# Patient Record
Sex: FEMALE | Race: WHITE | NOT HISPANIC OR LATINO | Employment: OTHER | URBAN - METROPOLITAN AREA
[De-identification: names, ages, dates, MRNs, and addresses within clinical notes are randomized per-mention and may not be internally consistent; named-entity substitution may affect disease eponyms.]

---

## 2017-03-21 RX ORDER — SERTRALINE HYDROCHLORIDE 100 MG/1
TABLET, FILM COATED ORAL
Qty: 135 TABLET | Refills: 0 | Status: SHIPPED | OUTPATIENT
Start: 2017-03-21 | End: 2017-03-30 | Stop reason: SDUPTHER

## 2017-03-29 ENCOUNTER — DOCUMENTATION ONLY (OUTPATIENT)
Dept: ADMINISTRATIVE | Facility: HOSPITAL | Age: 57
End: 2017-03-29

## 2017-03-29 NOTE — PROGRESS NOTES
PRE-VISIT CHART REVIEW    Appointment Scheduled on 3/30/17    Department stratifications & guidelines reviewed:yes    Target Chronic Diagnosis: None    Chronic Diagnosis Intervention Due: no    Goals Updated:N/A    Health Maintenance Due   Topic Date Due    Hepatitis C Screening  1960    Mammogram  05/23/2016    Lipid Panel  05/23/2016    Influenza Vaccine  08/01/2016    Colonoscopy  04/24/2017       Advanced Directives:   65 years of age or older?  No  Directive on file?  N\A                                      Pre-visit patient communication:  In Person    Studies or screenings scheduled pre-visit: no

## 2017-03-30 ENCOUNTER — OFFICE VISIT (OUTPATIENT)
Dept: FAMILY MEDICINE | Facility: CLINIC | Age: 57
End: 2017-03-30
Payer: COMMERCIAL

## 2017-03-30 VITALS
HEART RATE: 74 BPM | DIASTOLIC BLOOD PRESSURE: 71 MMHG | BODY MASS INDEX: 20.16 KG/M2 | TEMPERATURE: 98 F | HEIGHT: 67 IN | SYSTOLIC BLOOD PRESSURE: 116 MMHG | WEIGHT: 128.44 LBS | RESPIRATION RATE: 12 BRPM

## 2017-03-30 DIAGNOSIS — Z00.00 ROUTINE PHYSICAL EXAMINATION: Primary | ICD-10-CM

## 2017-03-30 DIAGNOSIS — Z12.11 COLON CANCER SCREENING: ICD-10-CM

## 2017-03-30 DIAGNOSIS — Z11.59 NEED FOR HEPATITIS C SCREENING TEST: ICD-10-CM

## 2017-03-30 DIAGNOSIS — F41.9 ANXIETY: ICD-10-CM

## 2017-03-30 DIAGNOSIS — Z00.00 LABORATORY EXAM ORDERED AS PART OF ROUTINE GENERAL MEDICAL EXAMINATION: ICD-10-CM

## 2017-03-30 DIAGNOSIS — L98.9 SKIN LESION: ICD-10-CM

## 2017-03-30 DIAGNOSIS — F32.A DEPRESSION, UNSPECIFIED DEPRESSION TYPE: ICD-10-CM

## 2017-03-30 DIAGNOSIS — Z12.39 BREAST CANCER SCREENING: ICD-10-CM

## 2017-03-30 PROCEDURE — 99396 PREV VISIT EST AGE 40-64: CPT | Mod: S$GLB,,, | Performed by: NURSE PRACTITIONER

## 2017-03-30 RX ORDER — SERTRALINE HYDROCHLORIDE 100 MG/1
TABLET, FILM COATED ORAL
Qty: 135 TABLET | Refills: 11 | Status: SHIPPED | OUTPATIENT
Start: 2017-03-30 | End: 2017-04-20 | Stop reason: SDUPTHER

## 2017-03-30 NOTE — MR AVS SNAPSHOT
[facility]  [address]  [place] [address]  Phone: [phone]  Fax: [phone]                  Nina Menendez   3/30/2017 3:00 PM   Office Visit    Description:  Female : 1960   Provider:  Marisa Truong NP   Department:  [facility]           Reason for Visit     Medication Refill           Diagnoses this Visit        Comments    Need for hepatitis C screening test    -  Primary     Laboratory exam ordered as part of routine general medical examination         Breast cancer screening         Colon cancer screening         Skin lesion                To Do List           Future Appointments        Provider Department Dept Phone    4/3/2017 7:45 AM [id number] [phone]    4/3/2017 9:00 AM [facility] [place] [facility] [phone]      Goals (5 Years of Data)     None       These Medications        Disp Refills Start End    sertraline (ZOLOFT) 100 MG tablet 135 tablet 11 3/30/2017     TAKE ONE & ONE-HALF TABLETS BY MOUTH ONCE DAILY    Pharmacy: [address] #: [phone]         [facility]     [facility] Nurse Care Line -  Assistance  Unless otherwise directed by your provider, please contact [facility], our nurse care line that is available for  assistance.     Registered nurses in the [facility] provide: appointment scheduling, clinical advisement, health education, and other advisory services.  Call: [phone] (toll free)               Medications           Message regarding Medications     Verify the changes and/or additions to your medication regime listed below are the same as discussed with your clinician today.  If any of these changes or additions are incorrect, please notify your healthcare provider.             Verify that the below list of medications is an  "accurate representation of the medications you are currently taking.  If none reported, the list may be blank. If incorrect, please contact your healthcare provider. Carry this list with you in case of emergency.           Current Medications     diphenhydrAMINE (BENADRYL) 25 mg capsule Take 25 mg by mouth every 6 (six) hours as needed.    sertraline (ZOLOFT) 100 MG tablet TAKE ONE & ONE-HALF TABLETS BY MOUTH ONCE DAILY           Clinical Reference Information           Your Vitals Were     BP Pulse Temp Resp Height Weight    116/71 74 98.3 °F (36.8 °C) (Oral) 12 5' 7" (1.702 m) 58.3 kg (128 lb 6.7 oz)    Last Period BMI             11/14/2012 20.11 kg/m2         Blood Pressure          Most Recent Value    BP  116/71      Allergies as of 3/30/2017     Demerol [Meperidine]      Immunizations Administered on Date of Encounter - 3/30/2017     None      Orders Placed During Today's Visit      Normal Orders This Visit    Ambulatory consult to Dermatology     Case request GI: COLONOSCOPY     Future Labs/Procedures Expected by Expires    CBC auto differential  3/30/2017 5/29/2018    Comprehensive metabolic panel  3/30/2017 5/29/2018    Hemoglobin A1c  3/30/2017 5/29/2018    Hepatitis C antibody  3/30/2017 5/29/2018    Lipid panel  3/30/2017 5/29/2018    Mammo Digital Screening Bilat with CAD  3/30/2017 5/31/2018    TSH  3/30/2017 5/29/2018      Language Assistance Services     ATTENTION: Language assistance services are available, free of charge. Please call [phone].      ATENCIÓN: Si habla español, tiene a fontenot disposición servicios gratuitos de asistencia lingüística. Llame al [phone].     CHÚ Ý: N?u b?n nói Ti?ng Vi?t, có các d?ch v? h? tr? ngôn ng? mi?n phí dành cho b?n. G?i s? [phone].         [facility] complies with applicable Federal civil rights laws and does not discriminate on the basis of race, color, national origin, age, disability, or sex.        "

## 2017-03-30 NOTE — MEDICAL/APP STUDENT
Subjective:       Patient ID: Nina Menendez is a 56 y.o. female.    Chief Complaint: Medication Refill    HPI: Patient here for annual exam and medication refill. Currently on Zoloft 50mg by mouth daily which she states compliance and adequate at this time. Denies any acute changes in health since last exam one year ago. Does indicate that she has a mole on inner right thigh that she would like to be examined because it seems irregular in shape and palpation. No reports or bleeding, recent enlargement, or tenderness. Reports a residual, mild, intermittent cough that is non-productive from recent upper respiratory infection which is now improved.     Review of Systems   Constitutional: Negative.  Negative for activity change, appetite change, fatigue, fever and unexpected weight change.   HENT: Negative.  Negative for ear pain, hearing loss, nosebleeds, sinus pressure, sore throat, tinnitus, trouble swallowing and voice change.    Eyes: Negative.  Negative for photophobia, pain, redness and visual disturbance.   Respiratory: Positive for cough. Negative for chest tightness, shortness of breath and wheezing.    Cardiovascular: Negative for chest pain, palpitations and leg swelling.   Gastrointestinal: Negative for abdominal pain, blood in stool, constipation, diarrhea, nausea and vomiting.   Endocrine: Negative.  Negative for cold intolerance, heat intolerance, polydipsia, polyphagia and polyuria.   Genitourinary: Negative for difficulty urinating, dysuria, flank pain, hematuria, pelvic pain, vaginal bleeding, vaginal discharge and vaginal pain.   Musculoskeletal: Negative for arthralgias, back pain, myalgias, neck pain and neck stiffness.   Skin: Negative.  Negative for color change, pallor, rash and wound.        Mole right inner thigh that she feels is irregular. Denies any bleeding, increased size, or discomfort.    Allergic/Immunologic: Negative for environmental allergies, food allergies and  immunocompromised state.   Neurological: Negative for dizziness, weakness, light-headedness, numbness and headaches.   Hematological: Negative.  Negative for adenopathy. Does not bruise/bleed easily.   Psychiatric/Behavioral: Negative for dysphoric mood. The patient is nervous/anxious.        Objective:      Physical Exam   Constitutional: She is oriented to person, place, and time. Vital signs are normal. She appears well-developed and well-nourished.   HENT:   Head: Normocephalic.   Right Ear: Hearing, tympanic membrane, external ear and ear canal normal.   Left Ear: Hearing, tympanic membrane, external ear and ear canal normal.   Mouth/Throat: Uvula is midline, oropharynx is clear and moist and mucous membranes are normal. Tonsils are 1+ on the right. Tonsils are 1+ on the left. No tonsillar exudate.   Eyes: EOM are normal. Pupils are equal, round, and reactive to light.   Neck: Trachea normal, normal range of motion and full passive range of motion without pain. Neck supple. No JVD present. Carotid bruit is not present. No thyroid mass and no thyromegaly present.   Cardiovascular: Normal rate, regular rhythm, S1 normal, S2 normal and intact distal pulses.  Exam reveals gallop. Exam reveals no S3, no S4 and no friction rub.    No murmur heard.  Pulmonary/Chest: Effort normal and breath sounds normal.   Abdominal: Soft. Normal appearance and bowel sounds are normal. There is no tenderness.   Musculoskeletal: Normal range of motion.   Lymphadenopathy:     She has no cervical adenopathy.        Right cervical: No posterior cervical adenopathy present.       Left cervical: No posterior cervical adenopathy present.        Right: No supraclavicular adenopathy present.        Left: No supraclavicular adenopathy present.   Neurological: She is alert and oriented to person, place, and time. GCS eye subscore is 4. GCS verbal subscore is 5. GCS motor subscore is 6.   Skin: Skin is warm, dry and intact.   Psychiatric: She  has a normal mood and affect. Her speech is normal and behavior is normal.   Nursing note and vitals reviewed.      Assessment:       1. Need for hepatitis C screening test    2. Laboratory exam ordered as part of routine general medical examination    3. Breast cancer screening    4. Colon cancer screening    5. Skin lesion        Plan:

## 2017-03-30 NOTE — PROGRESS NOTES
Subjective:       Patient ID: Nina Menendez is a 56 y.o. female.    Chief Complaint: Medication Refill    HPI  Review of Systems    Objective:      Physical Exam    Assessment:       1. Need for hepatitis C screening test    2. Laboratory exam ordered as part of routine general medical examination    3. Breast cancer screening    4. Colon cancer screening    5. Skin lesion        Plan:       ***

## 2017-03-31 ENCOUNTER — TELEPHONE (OUTPATIENT)
Dept: FAMILY MEDICINE | Facility: CLINIC | Age: 57
End: 2017-03-31

## 2017-03-31 RX ORDER — NITROFURANTOIN (MACROCRYSTALS) 100 MG/1
100 CAPSULE ORAL NIGHTLY PRN
Qty: 30 CAPSULE | Refills: 0 | Status: SHIPPED | OUTPATIENT
Start: 2017-03-31 | End: 2017-06-07 | Stop reason: SDUPTHER

## 2017-03-31 NOTE — TELEPHONE ENCOUNTER
Spoke to pt and she stated at visit yesterday you discussed her getting on ABX.   This was not sent in to pharmacy.

## 2017-04-01 NOTE — PROGRESS NOTES
Medication Refill     HPI: Patient here for annual exam and medication refill. Currently on Zoloft 50mg by mouth daily which she states compliance and adequate at this time. Denies any acute changes in health since last exam one year ago. Does indicate that she has a mole on inner right thigh that she would like to be examined because it seems irregular in shape and palpation. No reports or bleeding, recent enlargement, or tenderness. Reports a residual, mild, intermittent cough that is non-productive from recent upper respiratory infection which is now improved.      Review of Systems   Constitutional: Negative. Negative for activity change, appetite change, fatigue, fever and unexpected weight change.   HENT: Negative. Negative for ear pain, hearing loss, nosebleeds, sinus pressure, sore throat, tinnitus, trouble swallowing and voice change.   Eyes: Negative. Negative for photophobia, pain, redness and visual disturbance.   Respiratory: Positive for cough. Negative for chest tightness, shortness of breath and wheezing.   Cardiovascular: Negative for chest pain, palpitations and leg swelling.   Gastrointestinal: Negative for abdominal pain, blood in stool, constipation, diarrhea, nausea and vomiting.   Endocrine: Negative. Negative for cold intolerance, heat intolerance, polydipsia, polyphagia and polyuria.   Genitourinary: Negative for difficulty urinating, dysuria, flank pain, hematuria, pelvic pain, vaginal bleeding, vaginal discharge and vaginal pain.   Musculoskeletal: Negative for arthralgias, back pain, myalgias, neck pain and neck stiffness.   Skin: Negative. Negative for color change, pallor, rash and wound.   Mole right inner thigh that she feels is irregular. Denies any bleeding, increased size, or discomfort.    Allergic/Immunologic: Negative for environmental allergies, food allergies and immunocompromised state.   Neurological: Negative for dizziness, weakness, light-headedness, numbness and  "headaches.   Hematological: Negative. Negative for adenopathy. Does not bruise/bleed easily.   Psychiatric/Behavioral: Negative for dysphoric mood. The patient is nervous/anxious.       Objective:      Vitals:    03/30/17 1459   BP: 116/71   Pulse: 74   Resp: 12   Temp: 98.3 °F (36.8 °C)   TempSrc: Oral   Weight: 58.3 kg (128 lb 6.7 oz)   Height: 5' 7" (1.702 m)   PainSc: 0-No pain       Physical Exam   Constitutional: She is oriented to person, place, and time. Vital signs are normal. She appears well-developed and well-nourished.   HENT:   Head: Normocephalic.   Right Ear: Hearing, tympanic membrane, external ear and ear canal normal.   Left Ear: Hearing, tympanic membrane, external ear and ear canal normal.   Mouth/Throat: Uvula is midline, oropharynx is clear and moist and mucous membranes are normal. Tonsils are 1+ on the right. Tonsils are 1+ on the left. No tonsillar exudate.   Eyes: EOM are normal. Pupils are equal, round, and reactive to light.   Neck: Trachea normal, normal range of motion and full passive range of motion without pain. Neck supple. No JVD present. Carotid bruit is not present. No thyroid mass and no thyromegaly present.   Cardiovascular: Normal rate, regular rhythm, S1 normal, S2 normal and intact distal pulses. Exam reveals gallop. Exam reveals no S3, no S4 and no friction rub.   No murmur heard.  Pulmonary/Chest: Effort normal and breath sounds normal.   Abdominal: Soft. Normal appearance and bowel sounds are normal. There is no tenderness.   Musculoskeletal: Normal range of motion.   Lymphadenopathy:   She has no cervical adenopathy.   Right cervical: No posterior cervical adenopathy present.  Left cervical: No posterior cervical adenopathy present.   Right: No supraclavicular adenopathy present.   Left: No supraclavicular adenopathy present.   Neurological: She is alert and oriented to person, place, and time. GCS eye subscore is 4. GCS verbal subscore is 5. GCS motor subscore is 6. "   Skin: Skin is warm, dry and intact.   Psychiatric: She has a normal mood and affect. Her speech is normal and behavior is normal.   Nursing note and vitals reviewed.      DX  Routine physical  Skin lesion  Depression/anxiety    Nina was seen today for medication refill.    Diagnoses and all orders for this visit:    Routine physical examination    Need for hepatitis C screening test  -     Hepatitis C antibody; Future    Laboratory exam ordered as part of routine general medical examination  -     CBC auto differential; Future  -     Comprehensive metabolic panel; Future  -     Hemoglobin A1c; Future  -     Lipid panel; Future  -     TSH; Future    Breast cancer screening  -     Mammo Digital Screening Bilat with CAD; Future    Colon cancer screening  -     Case request GI: COLONOSCOPY    Skin lesion  -     Cancel: Ambulatory consult to Dermatology  -     Ambulatory consult to Dermatology    Depression, unspecified depression type    Anxiety    Other orders  -     sertraline (ZOLOFT) 100 MG tablet; TAKE ONE & ONE-HALF TABLETS BY MOUTH ONCE DAILY

## 2017-04-03 ENCOUNTER — HOSPITAL ENCOUNTER (OUTPATIENT)
Dept: RADIOLOGY | Facility: HOSPITAL | Age: 57
Discharge: HOME | End: 2017-04-03
Attending: NURSE PRACTITIONER
Payer: COMMERCIAL

## 2017-04-03 DIAGNOSIS — Z12.39 BREAST CANCER SCREENING: ICD-10-CM

## 2017-04-03 DIAGNOSIS — Z12.31 VISIT FOR SCREENING MAMMOGRAM: ICD-10-CM

## 2017-04-03 PROCEDURE — 77067 SCR MAMMO BI INCL CAD: CPT | Mod: TC

## 2017-04-03 PROCEDURE — 77063 BREAST TOMOSYNTHESIS BI: CPT | Mod: 26,,, | Performed by: RADIOLOGY

## 2017-04-03 PROCEDURE — 77067 SCR MAMMO BI INCL CAD: CPT | Mod: 26,,, | Performed by: RADIOLOGY

## 2017-04-20 RX ORDER — SERTRALINE HYDROCHLORIDE 100 MG/1
TABLET, FILM COATED ORAL
Qty: 135 TABLET | Refills: 1 | Status: SHIPPED | OUTPATIENT
Start: 2017-04-20

## 2017-04-20 NOTE — TELEPHONE ENCOUNTER
----- Message from Bud Go sent at 4/20/2017 10:41 AM CDT -----  Contact: Patient  Patient called requesting that script be fax to [facility] for  135 tabs for 90 days mail order (Sertraline ) fax # [phone]. , [phone] If any questions call back at [phone]. Thanks,

## 2017-05-09 ENCOUNTER — PATIENT OUTREACH (OUTPATIENT)
Dept: ADMINISTRATIVE | Facility: HOSPITAL | Age: 57
End: 2017-05-09

## 2017-05-09 NOTE — PROGRESS NOTES
PRE-VISIT CHART REVIEW    Appointment Scheduled on 5/23/17    Department stratifications & guidelines reviewed:yes    Target Chronic Diagnosis: None    Chronic Diagnosis Intervention Due: no    Goals Updated:N/A    Health Maintenance Due   Topic Date Due    Colonoscopy  04/24/2017       Advanced Directives:   65 years of age or older?  No  Directive on file?  N\A                                      Pre-visit patient communication: 05/09/2017 MyChart/Letter    Studies or screenings scheduled pre-visit: no

## 2017-05-09 NOTE — LETTER
May 15, 2017    Nina Menendez  [address]  [place] [address]             [facility]  [address]  [facility] [address]  Phone: [phone] Dear Mrs. Menendez:    We have tried to reach you by mychart unsuccessfully.    [facility] is committed to your overall health.  To help you get the most out of each of your visits, we will review your information to make sure you are up to date on all of your recommended tests and/or procedures.       Dr. Renee Joshi has found that you may be due for colon cancer screening.     If you have had any of the above done at another facility, please bring the records or information with you so that your record at [facility] will be complete.  If you would like to schedule any of these, please contact me.     If you are currently taking medication, please bring it with you to your appointment for review.     If you have any questions or concerns, please don't hesitate to call.    Thank you for letting us care for you,  Barbara Brizuela LPN Clinical Care Coordinator  [facility]  [phone]

## 2017-05-12 ENCOUNTER — TELEPHONE (OUTPATIENT)
Dept: ENDOSCOPY | Facility: HOSPITAL | Age: 57
End: 2017-05-12

## 2017-05-12 NOTE — TELEPHONE ENCOUNTER
Spoke with patient via telephone for colonoscopy pre admit and patient states that she already notified Dr. Drake's office that she cancelled the procedure due to financial responsibility.

## 2017-06-07 RX ORDER — NITROFURANTOIN (MACROCRYSTALS) 100 MG/1
CAPSULE ORAL
Qty: 30 CAPSULE | Refills: 2 | Status: SHIPPED | OUTPATIENT
Start: 2017-06-07

## 2020-07-24 ENCOUNTER — LAB VISIT (OUTPATIENT)
Dept: PRIMARY CARE CLINIC | Facility: OTHER | Age: 60
End: 2020-07-24
Attending: INTERNAL MEDICINE
Payer: MEDICAID

## 2020-07-24 DIAGNOSIS — Z11.59 SPECIAL SCREENING EXAMINATION FOR UNSPECIFIED VIRAL DISEASE: ICD-10-CM

## 2020-07-24 PROCEDURE — U0003 INFECTIOUS AGENT DETECTION BY NUCLEIC ACID (DNA OR RNA); SEVERE ACUTE RESPIRATORY SYNDROME CORONAVIRUS 2 (SARS-COV-2) (CORONAVIRUS DISEASE [COVID-19]), AMPLIFIED PROBE TECHNIQUE, MAKING USE OF HIGH THROUGHPUT TECHNOLOGIES AS DESCRIBED BY CMS-2020-01-R: HCPCS

## 2020-07-27 LAB — SARS-COV-2 RNA RESP QL NAA+PROBE: NEGATIVE

## 2020-11-10 DIAGNOSIS — Z12.31 ENCOUNTER FOR SCREENING MAMMOGRAM FOR MALIGNANT NEOPLASM OF BREAST: Primary | ICD-10-CM

## 2020-12-11 ENCOUNTER — HOSPITAL ENCOUNTER (OUTPATIENT)
Dept: RADIOLOGY | Facility: HOSPITAL | Age: 60
Discharge: HOME | End: 2020-12-11
Attending: NURSE PRACTITIONER
Payer: MEDICAID

## 2020-12-11 DIAGNOSIS — Z12.31 ENCOUNTER FOR SCREENING MAMMOGRAM FOR MALIGNANT NEOPLASM OF BREAST: ICD-10-CM

## 2020-12-11 PROCEDURE — 77067 SCR MAMMO BI INCL CAD: CPT | Mod: TC,PO

## 2021-03-10 ENCOUNTER — IMMUNIZATION (OUTPATIENT)
Dept: PRIMARY CARE CLINIC | Facility: CLINIC | Age: 61
End: 2021-03-10

## 2021-03-10 DIAGNOSIS — Z23 NEED FOR VACCINATION: Primary | ICD-10-CM

## 2021-03-10 PROCEDURE — 91303 PR SARSCOV2 VAC AD26 .5ML IM: CPT | Mod: S$GLB,,, | Performed by: INTERNAL MEDICINE

## 2021-03-10 PROCEDURE — 0031A PR IMMUNIZ ADMIN, SARS-COV-2 COVID-19 VACC, 5X10VP/0.5ML: ICD-10-PCS | Mod: CV19,S$GLB,, | Performed by: INTERNAL MEDICINE

## 2021-03-10 PROCEDURE — 0031A PR IMMUNIZ ADMIN, SARS-COV-2 COVID-19 VACC, 5X10VP/0.5ML: CPT | Mod: CV19,S$GLB,, | Performed by: INTERNAL MEDICINE

## 2021-03-10 PROCEDURE — 91303 PR SARSCOV2 VAC AD26 .5ML IM: ICD-10-PCS | Mod: S$GLB,,, | Performed by: INTERNAL MEDICINE

## 2023-08-18 ENCOUNTER — TELEPHONE (OUTPATIENT)
Dept: FAMILY MEDICINE | Facility: CLINIC | Age: 63
End: 2023-08-18

## 2023-08-18 NOTE — TELEPHONE ENCOUNTER
----- Message from Yudelka Ku sent at 8/18/2023 12:47 PM CDT -----  Contact: self  Type:  Sooner Appointment Request    Caller is requesting a sooner appointment.  Caller declined first available appointment listed below.  Caller will not accept being placed on the waitlist and is requesting a message be sent to doctor.    Name of Caller:  pt  When is the first available appointment?  N/a  Symptoms:  n/a  Best Call Back Number:  [phone]   Additional Information:  please call

## 2023-09-26 DIAGNOSIS — Z78.0 MENOPAUSE: ICD-10-CM

## 2023-10-16 ENCOUNTER — LAB VISIT (OUTPATIENT)
Dept: LAB | Facility: HOSPITAL | Age: 63
End: 2023-10-16
Attending: INTERNAL MEDICINE
Payer: MEDICARE

## 2023-10-16 ENCOUNTER — OFFICE VISIT (OUTPATIENT)
Dept: PRIMARY CARE CLINIC | Facility: CLINIC | Age: 63
End: 2023-10-16
Payer: MEDICARE

## 2023-10-16 VITALS
TEMPERATURE: 98 F | HEIGHT: 66 IN | OXYGEN SATURATION: 98 % | DIASTOLIC BLOOD PRESSURE: 74 MMHG | BODY MASS INDEX: 20.07 KG/M2 | RESPIRATION RATE: 18 BRPM | SYSTOLIC BLOOD PRESSURE: 122 MMHG | HEART RATE: 67 BPM | WEIGHT: 124.88 LBS

## 2023-10-16 DIAGNOSIS — Z12.12 SCREENING FOR COLORECTAL CANCER: ICD-10-CM

## 2023-10-16 DIAGNOSIS — Z78.0 MENOPAUSE: ICD-10-CM

## 2023-10-16 DIAGNOSIS — Z11.4 SCREENING FOR HIV (HUMAN IMMUNODEFICIENCY VIRUS): ICD-10-CM

## 2023-10-16 DIAGNOSIS — F41.9 ANXIETY: ICD-10-CM

## 2023-10-16 DIAGNOSIS — Z11.59 NEED FOR HEPATITIS C SCREENING TEST: ICD-10-CM

## 2023-10-16 DIAGNOSIS — Z76.89 ENCOUNTER TO ESTABLISH CARE: Primary | ICD-10-CM

## 2023-10-16 DIAGNOSIS — R53.83 FATIGUE, UNSPECIFIED TYPE: ICD-10-CM

## 2023-10-16 DIAGNOSIS — Z12.11 SCREENING FOR COLORECTAL CANCER: ICD-10-CM

## 2023-10-16 DIAGNOSIS — Z13.6 SCREENING FOR CARDIOVASCULAR CONDITION: ICD-10-CM

## 2023-10-16 DIAGNOSIS — F32.0 CURRENT MILD EPISODE OF MAJOR DEPRESSIVE DISORDER, UNSPECIFIED WHETHER RECURRENT: ICD-10-CM

## 2023-10-16 LAB
ALBUMIN SERPL BCP-MCNC: 4.3 G/DL (ref 3.5–5.2)
ALP SERPL-CCNC: 83 U/L (ref 55–135)
ALT SERPL W/O P-5'-P-CCNC: 12 U/L (ref 10–44)
ANION GAP SERPL CALC-SCNC: 10 MMOL/L (ref 8–16)
AST SERPL-CCNC: 18 U/L (ref 10–40)
BASOPHILS # BLD AUTO: 0.04 K/UL (ref 0–0.2)
BASOPHILS NFR BLD: 0.9 % (ref 0–1.9)
BILIRUB SERPL-MCNC: 0.3 MG/DL (ref 0.1–1)
BUN SERPL-MCNC: 25 MG/DL (ref 8–23)
CALCIUM SERPL-MCNC: 9.8 MG/DL (ref 8.7–10.5)
CHLORIDE SERPL-SCNC: 105 MMOL/L (ref 95–110)
CHOLEST SERPL-MCNC: 255 MG/DL (ref 120–199)
CHOLEST/HDLC SERPL: 3.5 {RATIO} (ref 2–5)
CO2 SERPL-SCNC: 21 MMOL/L (ref 23–29)
CREAT SERPL-MCNC: 1.3 MG/DL (ref 0.5–1.4)
DIFFERENTIAL METHOD: ABNORMAL
EOSINOPHIL # BLD AUTO: 0.2 K/UL (ref 0–0.5)
EOSINOPHIL NFR BLD: 4 % (ref 0–8)
ERYTHROCYTE [DISTWIDTH] IN BLOOD BY AUTOMATED COUNT: 12.9 % (ref 11.5–14.5)
EST. GFR  (NO RACE VARIABLE): 45.6 ML/MIN/1.73 M^2
GLUCOSE SERPL-MCNC: 88 MG/DL (ref 70–110)
HCT VFR BLD AUTO: 38.2 % (ref 37–48.5)
HCV AB SERPL QL IA: NORMAL
HDLC SERPL-MCNC: 72 MG/DL (ref 40–75)
HDLC SERPL: 28.2 % (ref 20–50)
HGB BLD-MCNC: 12.4 G/DL (ref 12–16)
HIV 1+2 AB+HIV1 P24 AG SERPL QL IA: NORMAL
IMM GRANULOCYTES # BLD AUTO: 0 K/UL (ref 0–0.04)
IMM GRANULOCYTES NFR BLD AUTO: 0 % (ref 0–0.5)
LDLC SERPL CALC-MCNC: 169.2 MG/DL (ref 63–159)
LYMPHOCYTES # BLD AUTO: 2.3 K/UL (ref 1–4.8)
LYMPHOCYTES NFR BLD: 52.7 % (ref 18–48)
MCH RBC QN AUTO: 28.4 PG (ref 27–31)
MCHC RBC AUTO-ENTMCNC: 32.5 G/DL (ref 32–36)
MCV RBC AUTO: 88 FL (ref 82–98)
MONOCYTES # BLD AUTO: 0.4 K/UL (ref 0.3–1)
MONOCYTES NFR BLD: 9.6 % (ref 4–15)
NEUTROPHILS # BLD AUTO: 1.4 K/UL (ref 1.8–7.7)
NEUTROPHILS NFR BLD: 32.8 % (ref 38–73)
NONHDLC SERPL-MCNC: 183 MG/DL
NRBC BLD-RTO: 0 /100 WBC
PLATELET # BLD AUTO: 288 K/UL (ref 150–450)
PMV BLD AUTO: 9.5 FL (ref 9.2–12.9)
POTASSIUM SERPL-SCNC: 4.4 MMOL/L (ref 3.5–5.1)
PROT SERPL-MCNC: 7.5 G/DL (ref 6–8.4)
RBC # BLD AUTO: 4.36 M/UL (ref 4–5.4)
SODIUM SERPL-SCNC: 136 MMOL/L (ref 136–145)
TRIGL SERPL-MCNC: 69 MG/DL (ref 30–150)
TSH SERPL DL<=0.005 MIU/L-ACNC: 1.74 UIU/ML (ref 0.4–4)
WBC # BLD AUTO: 4.27 K/UL (ref 3.9–12.7)

## 2023-10-16 PROCEDURE — 99204 OFFICE O/P NEW MOD 45 MIN: CPT | Mod: S$GLB,,, | Performed by: INTERNAL MEDICINE

## 2023-10-16 PROCEDURE — 84443 ASSAY THYROID STIM HORMONE: CPT | Performed by: INTERNAL MEDICINE

## 2023-10-16 PROCEDURE — 3288F FALL RISK ASSESSMENT DOCD: CPT | Mod: CPTII,S$GLB,, | Performed by: INTERNAL MEDICINE

## 2023-10-16 PROCEDURE — 1101F PR PT FALLS ASSESS DOC 0-1 FALLS W/OUT INJ PAST YR: ICD-10-PCS | Mod: CPTII,S$GLB,, | Performed by: INTERNAL MEDICINE

## 2023-10-16 PROCEDURE — 1160F RVW MEDS BY RX/DR IN RCRD: CPT | Mod: CPTII,S$GLB,, | Performed by: INTERNAL MEDICINE

## 2023-10-16 PROCEDURE — 80061 LIPID PANEL: CPT | Performed by: INTERNAL MEDICINE

## 2023-10-16 PROCEDURE — 3074F PR MOST RECENT SYSTOLIC BLOOD PRESSURE < 130 MM HG: ICD-10-PCS | Mod: CPTII,S$GLB,, | Performed by: INTERNAL MEDICINE

## 2023-10-16 PROCEDURE — 3008F BODY MASS INDEX DOCD: CPT | Mod: CPTII,S$GLB,, | Performed by: INTERNAL MEDICINE

## 2023-10-16 PROCEDURE — 85025 COMPLETE CBC W/AUTO DIFF WBC: CPT | Performed by: INTERNAL MEDICINE

## 2023-10-16 PROCEDURE — 36415 COLL VENOUS BLD VENIPUNCTURE: CPT | Mod: PN | Performed by: INTERNAL MEDICINE

## 2023-10-16 PROCEDURE — 1158F PR ADVANCE CARE PLANNING DISCUSS DOCUMENTED IN MEDICAL RECORD: ICD-10-PCS | Mod: CPTII,S$GLB,, | Performed by: INTERNAL MEDICINE

## 2023-10-16 PROCEDURE — 99999 PR PBB SHADOW E&M-EST. PATIENT-LVL IV: CPT | Mod: PBBFAC,,, | Performed by: INTERNAL MEDICINE

## 2023-10-16 PROCEDURE — 86803 HEPATITIS C AB TEST: CPT | Performed by: INTERNAL MEDICINE

## 2023-10-16 PROCEDURE — 80053 COMPREHEN METABOLIC PANEL: CPT | Performed by: INTERNAL MEDICINE

## 2023-10-16 PROCEDURE — 99999 PR PBB SHADOW E&M-EST. PATIENT-LVL IV: ICD-10-PCS | Mod: PBBFAC,,, | Performed by: INTERNAL MEDICINE

## 2023-10-16 PROCEDURE — 3074F SYST BP LT 130 MM HG: CPT | Mod: CPTII,S$GLB,, | Performed by: INTERNAL MEDICINE

## 2023-10-16 PROCEDURE — 1160F PR REVIEW ALL MEDS BY PRESCRIBER/CLIN PHARMACIST DOCUMENTED: ICD-10-PCS | Mod: CPTII,S$GLB,, | Performed by: INTERNAL MEDICINE

## 2023-10-16 PROCEDURE — 3078F DIAST BP <80 MM HG: CPT | Mod: CPTII,S$GLB,, | Performed by: INTERNAL MEDICINE

## 2023-10-16 PROCEDURE — 87389 HIV-1 AG W/HIV-1&-2 AB AG IA: CPT | Performed by: INTERNAL MEDICINE

## 2023-10-16 PROCEDURE — 1159F PR MEDICATION LIST DOCUMENTED IN MEDICAL RECORD: ICD-10-PCS | Mod: CPTII,S$GLB,, | Performed by: INTERNAL MEDICINE

## 2023-10-16 PROCEDURE — 1159F MED LIST DOCD IN RCRD: CPT | Mod: CPTII,S$GLB,, | Performed by: INTERNAL MEDICINE

## 2023-10-16 PROCEDURE — 3008F PR BODY MASS INDEX (BMI) DOCUMENTED: ICD-10-PCS | Mod: CPTII,S$GLB,, | Performed by: INTERNAL MEDICINE

## 2023-10-16 PROCEDURE — 99204 PR OFFICE/OUTPT VISIT, NEW, LEVL IV, 45-59 MIN: ICD-10-PCS | Mod: S$GLB,,, | Performed by: INTERNAL MEDICINE

## 2023-10-16 PROCEDURE — 3288F PR FALLS RISK ASSESSMENT DOCUMENTED: ICD-10-PCS | Mod: CPTII,S$GLB,, | Performed by: INTERNAL MEDICINE

## 2023-10-16 PROCEDURE — 3078F PR MOST RECENT DIASTOLIC BLOOD PRESSURE < 80 MM HG: ICD-10-PCS | Mod: CPTII,S$GLB,, | Performed by: INTERNAL MEDICINE

## 2023-10-16 PROCEDURE — 1158F ADVNC CARE PLAN TLK DOCD: CPT | Mod: CPTII,S$GLB,, | Performed by: INTERNAL MEDICINE

## 2023-10-16 PROCEDURE — 1101F PT FALLS ASSESS-DOCD LE1/YR: CPT | Mod: CPTII,S$GLB,, | Performed by: INTERNAL MEDICINE

## 2023-10-16 RX ORDER — BUPROPION HYDROCHLORIDE 150 MG/1
TABLET, EXTENDED RELEASE ORAL
COMMUNITY
End: 2023-10-19 | Stop reason: SDUPTHER

## 2023-10-16 NOTE — PROGRESS NOTES
[facility] INITIAL V[facility]IT [facility]      CHIEF COMPLAINT     Chief Complaint   Patient presents with    Establish Care       HPI     Nina Menendez is a 63 y.o.  female who presents with a PMHx of  anxiety and depression, HLD, insomnia, who presents today to establish care     Her main complaints and concerns are:     Insomnia: sometimes trouble falling asleep and sometime trouble staying asleep. Has not tried anything like melatonin.     Anxiety: says her mood is "good". Gets lonely when her daughter leaves on Sunday. But one of her twins live with her. used to take zoloft for depression. Says not sure when this was stopped. Now takes wellbutrin  takes zoloft 100mg daily.   GAD7 is 7/21  Denies HI/SI   PHQ 9 is 5/27     HLD: no recent labs on file.     Insomnia: Says her sleep is 'not good"   Has trouble with sleep initiation/maintenance    Smoking history. Chapito smoked 20 years ago. Then during covid smoked for a short while,. But has since stopped        CHRONIC HEALTH ISSUES:   Past Medical History:  Past Medical History:   Diagnosis Date    Anxiety     ASCVD 10 YEAR RISK 1.9% 04/06/2017    Depression     Hyperlipemia     in the 90s    Insomnia     S/P colonoscopy     4/12;  next due 4/17         Geriatric Assessment    ADLs : independent  iADLs: independent    Polypharmacy:no. Stopped the benadryl.     Visual impairments: no change    Hearing impairment: No change    Urinary incontinence: no     Malnutrition: no     Gait/balance/falls: no falls, normal balance gait     Depression: PHQ2. "Good"     Sleep: see HPI     Cognitive Problems: minicog.5/5. no memory complaints.     Environmental/social problems: lives by herself with her daughter. Feels safe at home. No rugs. No grab bars. No guns at home     Functional status:     Support system:     Advanced care planning:  does not have on file. But discussed at length today and she will fill out the paperwork and return it to clinic to be scanned in. "    Date: 10/16/2023    Power of   I initiated the process of voluntary advance care planning today and explained the importance of this process to the patient.  I introduced the concept of advance directives to the patient, as well. Then the patient received detailed information about the importance of designating a Health Care Power of  (HCPOA). She was also instructed to communicate with this person about their wishes for future healthcare, should she become sick and lose decision-making capacity. The patient has not previously appointed a HCPOA. After our discussion, the patient has decided to complete a HCPOA and has appointed her son, health care agent:  on file  & health care agent number:  on fnile  I spent a total time of 10 minutes discussing this issue with the patient.              Worry score 1\    Past Surgical History:  Past Surgical History:   Procedure Laterality Date    FOOT SURGERY Bilateral 2008    fracture coorrection in right, toe surgery on the left foot    FRACTURE SURGERY      R foot     OVARIAN CYST REMOVAL      TONSILLECTOMY         Allergies:  Review of patient's allergies indicates:   Allergen Reactions    Demerol [meperidine] Itching       Home Medications:  Prior to Admission medications    Medication Sig Start Date End Date Taking? Authorizing Provider   diphenhydrAMINE (BENADRYL) 25 mg capsule Take 25 mg by mouth every 6 (six) hours as needed.    Provider, Historical   nitrofurantoin (MACRODANTIN) 100 MG capsule TAKE ONE CAPSULE BY MOUTH ONCE DAILY AT BEDTIME AS NEEDED 6/7/17   Marisa Truong NP   sertraline (ZOLOFT) 100 MG tablet TAKE ONE & ONE-HALF TABLETS BY MOUTH ONCE DAILY 4/20/17   Marisa Truong NP       Family History:  Family History   Problem Relation Age of Onset    Thyroid disease Mother     Hyperlipidemia Mother     Brain cancer Father     Hyperlipidemia Father     Drug abuse Brother     No Known Problems Brother     Hypertension Brother      "Heart attack Maternal Grandmother     Arthritis Maternal Grandfather     Hyperlipidemia Paternal Grandmother     Diabetes Paternal Grandmother     Lung cancer Paternal Grandfather        Social History:  Social History     Tobacco Use    Smoking status: Former     Current packs/day: 0.00     Average packs/day: 1.5 packs/day for 16.0 years (24.0 ttl pk-yrs)     Types: Cigarettes     Start date: 3/8/1979     Quit date: 3/8/1995     Years since quittin.6    Smokeless tobacco: Never   Substance Use Topics    Alcohol use: No     Comment: martini's/cocktails 1-2 xs a month    Drug use: No       Review of Systems:  ROS    Health Maintainence:   TDap is up to date, Influenza is up to date   Pneumovax is not up to date.   Zostavax is UTD.       Covid Is utd  Cancer Screening:  PAP: is up to date.   Mammogram: is up to date.   Colonoscopy: is not up to date.   DEXA:  is not up to date.   Low Dose CT scan does not qualify. Quit smoking 28y years ago. Restarted during covid for a few weeks  Screening:  Hepatitis C is up to date in pts born between 0901-8559. **    PHYSICAL EXAM     /74 (BP Location: Left arm, Patient Position: Sitting, BP Method: Medium (Manual))   Pulse 67   Temp 97.9 °F (36.6 °C) (Oral)   Resp 18   Ht 5' 6" (1.676 m)   Wt 56.7 kg (124 lb 14.3 oz)   LMP 2012   SpO2 98%   BMI 20.16 kg/m²     GEN - A+OX4, NAD   HEENT - PERRL, EOMI, OP clear. MMM.   Neck - No thyromegaly or cervical LAD. No thyroid masses felt.  CV - RRR, no m/r   Chest - CTAB, no wheezing or rhonchi  Abd - S/NT/ND/+BS.   Ext - 2+BDP and radial pulses. No LE edema.   Neuro - PERRL, EOMI, no nystagmus, eyebrow raise, facial sensation, hearing, m of mastication, smile, palatal raise, shoulder shrug, tongue protrusion symmetric and intact. 5/5 BUE and BLE strength. Sensation to light touch intact throughout. 2+ DTRs. Normal gait.   MSK - No spinal tenderness to palpation. Normal gait.   Skin - No rash.    LABS     Previous " labs reviewed.      ASSESSMENT/PLAN     Nina Menenedz is a 63 y.o. female with  1. Encounter to establish care  -medications reviewed and consolidated  -reviewed PMH, medications, HCM including vaccinations.   -reviewed most recent lab work. Reordered as needed. Discussed abnormalities with patient with time allowed for questions.   -reviewed clinic policy with patient including to arrive 15 mins before appointments, labs and results including expected turn around for results.   -outside records and consultants notes reviewed as time allowed. Updated treatment team with name of other providers.   -reviewed and confirmed patients contact information, preferred pharmacy etc.   -AVS provided after visit to summarized things discussed.   -The following assessments were completed:  Living Situation  CAGE  Depression Screening  Timed Get Up and Go  Cognitive Function Screening-Minicog   Nutrition Screening  ADL Screening      2. Anxiety  Overview:  GAD7 7/21. Mood is good with wellbutrin. Used to be on zoloft but no more.   Denies SI/HI      3. Current mild episode of major depressive disorder, unspecified whether recurrent  Overview:  PHQ 9 5/27. Mood is good with wellbutrin. Used to be on zoloft but no more.   Denies SI/HI      4. Fatigue, unspecified type  -     CBC Auto Differential; Future; Expected date: 10/16/2023  -     Comprehensive Metabolic Panel; Future; Expected date: 10/16/2023  -     TSH; Future; Expected date: 10/16/2023    5. Screening for cardiovascular condition  -     Lipid Panel; Future; Expected date: 10/16/2023    6. Need for hepatitis C screening test  -     Hepatitis C Antibody; Future; Expected date: 10/16/2023    7. Screening for HIV (human immunodeficiency virus)  -     HIV 1/2 Ag/Ab (4th Gen); Future; Expected date: 10/16/2023    8. Screening for colorectal cancer  -     Case Request Endoscopy: COLONOSCOPY    9. Menopause  -     DXA Bone Density Axial Skeleton 1 or more sites; Future;  Expected date: 10/16/2023         ACP: discussion had about ACP to include definition of ACP, HCP, CODE STATUS. Paperwork handed to patient, to review, discuss with family and return it to clinic to be scanned into the chart.   Advance Care Planning             My total time spent on this encounter was at least 60 minutes which included  the following activities: preparing to see the patient, performing a medically appropriate and/or evaluation, counseling and educating the patient and family/caregiver, ordering medications, tests, or procedures, referring and communicating with other healthcare providers, documenting clinical information in the electronic or other health record. This time is independent and non-overlapping.         RTC in 6 months, sooner if needed and depending on labs.    Heydi Giordano MD  Board Certified Internist/Geriatrician  [facility] [place] [address] ([place])

## 2023-10-17 ENCOUNTER — TELEPHONE (OUTPATIENT)
Dept: PRIMARY CARE CLINIC | Facility: CLINIC | Age: 63
End: 2023-10-17
Payer: MEDICARE

## 2023-10-17 DIAGNOSIS — D72.820 LYMPHOCYTOSIS: Primary | ICD-10-CM

## 2023-10-17 NOTE — TELEPHONE ENCOUNTER
Patient was informed of her lab results and Dr. Giordano's recs. She verbalized understanding. Repeat lab scheduled.

## 2023-10-17 NOTE — PROGRESS NOTES
Recent labs show: no anemia, normal white blood cell count. Lympocyte count is a bit elevated. Would like to recheck and if still elevated will refer to hematology. Ordered, please help patient schedule appointment for labs in two weeks.   Cholesterol has worsened by a lot. Would suggest either significant lifestyle changes or a statin to lower this. Increase cardio exercise, eat health fats, high fiber diet. OTC supplements include red yeast rice etc.     CMP shows normal lytes, labs suggest she needs to consume more water. BUN is elevated and renal function has slightly declined. Hydrating more willl like fix that   Thyroid function is normal.   Hep C and HIV is negative as expected.   Overall, labs look good.   So repeat the CBC in two weeks and we should be good.

## 2023-10-17 NOTE — TELEPHONE ENCOUNTER
Patient notified of her bond density results. She verbalized understanding.    likely worsening dementia  -psych f/u done  will c/w haldol 0.5 mg q hs  -aricept 5 mg

## 2023-10-17 NOTE — TELEPHONE ENCOUNTER
----- Message from Heydi Giordano MD sent at 10/16/2023  4:13 PM CDT -----  Wonderful news. Normal bone density. That's glorious.. carry on as you were with these healthy bones

## 2023-10-17 NOTE — TELEPHONE ENCOUNTER
----- Message from Heydi Giordano MD sent at 10/17/2023  9:23 AM CDT -----  Recent labs show: no anemia, normal white blood cell count. Lympocyte count is a bit elevated. Would like to recheck and if still elevated will refer to hematology. Ordered, please help patient schedule appointment for labs in two weeks.   Cholesterol has worsened by a lot. Would suggest either significant lifestyle changes or a statin to lower this. Increase cardio exercise, eat health fats, high fiber diet. OTC supplements include red yeast rice etc.     CMP shows normal lytes, labs suggest she needs to consume more water. BUN is elevated and renal function has slightly declined. Hydrating more willl like fix that   Thyroid function is normal.   Hep C and HIV is negative as expected.   Overall, labs look good.   So repeat the CBC in two weeks and we should be good.

## 2023-10-19 RX ORDER — BUPROPION HYDROCHLORIDE 150 MG/1
150 TABLET, EXTENDED RELEASE ORAL 2 TIMES DAILY
Qty: 180 TABLET | Refills: 1 | Status: SHIPPED | OUTPATIENT
Start: 2023-10-19

## 2023-10-19 NOTE — TELEPHONE ENCOUNTER
----- Message from Myesha Javier sent at 10/18/2023  1:01 PM CDT -----  Regarding: refill needed  [phone] - call back ; needs refill for her       buPROPion (WELLBUTRIN SR) 150 MG TBSR 12 hr tablet; been waiting since Monday    Send to :      [place] [id number] - [facility]LIE [facility] - [address]  [address] [place] [address]  Phone: [phone] Fax: [phone]  Hours: Not open 24 hours    Myesha

## 2023-11-20 ENCOUNTER — TELEPHONE (OUTPATIENT)
Dept: GASTROENTEROLOGY | Facility: CLINIC | Age: 63
End: 2023-11-20
Payer: MEDICARE

## 2023-12-27 ENCOUNTER — TELEPHONE (OUTPATIENT)
Dept: GASTROENTEROLOGY | Facility: CLINIC | Age: 63
End: 2023-12-27
Payer: MEDICARE

## 2023-12-27 NOTE — TELEPHONE ENCOUNTER
Attempted to contact pt to schedule procedure. Unable to leave a message. Several attempts have been made to contact pt to schedule ordered procedure. Case request canceled. Letter placed in mail.

## 2024-01-05 ENCOUNTER — TELEPHONE (OUTPATIENT)
Dept: GASTROENTEROLOGY | Facility: CLINIC | Age: 64
End: 2024-01-05
Payer: MEDICARE

## 2024-01-05 NOTE — TELEPHONE ENCOUNTER
----- Message from Annemarie Madera LPN sent at 1/5/2024 11:19 AM CST -----  Contact: self    ----- Message -----  From: Ana Rogel, Patient Care Assistant  Sent: 1/5/2024  11:09 AM CST  To: [facility] Clinical Staff    Pt is calling to mary kate a colon test [phone]  thanks

## 2024-01-11 DIAGNOSIS — Z00.00 ENCOUNTER FOR MEDICARE ANNUAL WELLNESS EXAM: ICD-10-CM

## 2024-01-18 ENCOUNTER — TELEPHONE (OUTPATIENT)
Dept: GASTROENTEROLOGY | Facility: CLINIC | Age: 64
End: 2024-01-18
Payer: MEDICARE

## 2024-01-18 NOTE — TELEPHONE ENCOUNTER
Diagnosis is confirmed from the case order.     First Colonoscopy? No, polyps  Family history of colon cancer? no  Medical issues? no  Blood thinners? No  Preferred day: W.    I instruct CheryKelly Wood to have a someone she knows (no Uber or taxi) to drive her home after the procedure since she'll be sedated.  I also inform pt the exact arrival time will be provided to her by the surgery center a couple of days to the afternoon prior to the procedure date.  Inform pt I will send prep instructions via FlowJob and Mail.   Pt verbalizes understanding to the statements above.

## 2024-01-18 NOTE — TELEPHONE ENCOUNTER
----- Message from Daylin Nelson LPN sent at 1/18/2024  9:46 AM CST -----    ----- Message -----  From: Steven Collazo  Sent: 1/18/2024   9:45 AM CST  To: Noelle GREGORY Staff    Type:  Patient Returning Call    Who Called:  Patient  Who Left Message for Patient:  Tylorfelicia  Does the patient know what this is regarding?:  Colonoscopy  Best Call Back Number:   [phone]  Additional Information:

## 2024-03-01 ENCOUNTER — LAB VISIT (OUTPATIENT)
Dept: LAB | Facility: HOSPITAL | Age: 64
End: 2024-03-01
Attending: INTERNAL MEDICINE
Payer: MEDICARE

## 2024-03-01 DIAGNOSIS — D72.820 LYMPHOCYTOSIS: ICD-10-CM

## 2024-03-01 LAB
BASOPHILS # BLD AUTO: 0.05 K/UL (ref 0–0.2)
BASOPHILS NFR BLD: 0.7 % (ref 0–1.9)
DIFFERENTIAL METHOD BLD: NORMAL
EOSINOPHIL # BLD AUTO: 0.2 K/UL (ref 0–0.5)
EOSINOPHIL NFR BLD: 2.9 % (ref 0–8)
ERYTHROCYTE [DISTWIDTH] IN BLOOD BY AUTOMATED COUNT: 12.9 % (ref 11.5–14.5)
HCT VFR BLD AUTO: 38.8 % (ref 37–48.5)
HGB BLD-MCNC: 12.4 G/DL (ref 12–16)
IMM GRANULOCYTES # BLD AUTO: 0.02 K/UL (ref 0–0.04)
IMM GRANULOCYTES NFR BLD AUTO: 0.3 % (ref 0–0.5)
LYMPHOCYTES # BLD AUTO: 3.3 K/UL (ref 1–4.8)
LYMPHOCYTES NFR BLD: 47.6 % (ref 18–48)
MCH RBC QN AUTO: 28.3 PG (ref 27–31)
MCHC RBC AUTO-ENTMCNC: 32 G/DL (ref 32–36)
MCV RBC AUTO: 89 FL (ref 82–98)
MONOCYTES # BLD AUTO: 0.5 K/UL (ref 0.3–1)
MONOCYTES NFR BLD: 6.8 % (ref 4–15)
NEUTROPHILS # BLD AUTO: 2.9 K/UL (ref 1.8–7.7)
NEUTROPHILS NFR BLD: 41.7 % (ref 38–73)
NRBC BLD-RTO: 0 /100 WBC
PLATELET # BLD AUTO: 304 K/UL (ref 150–450)
PMV BLD AUTO: 9.6 FL (ref 9.2–12.9)
RBC # BLD AUTO: 4.38 M/UL (ref 4–5.4)
WBC # BLD AUTO: 6.96 K/UL (ref 3.9–12.7)

## 2024-03-01 PROCEDURE — 36415 COLL VENOUS BLD VENIPUNCTURE: CPT | Mod: PN | Performed by: INTERNAL MEDICINE

## 2024-03-01 PROCEDURE — 85025 COMPLETE CBC W/AUTO DIFF WBC: CPT | Performed by: INTERNAL MEDICINE

## 2024-03-05 DIAGNOSIS — G89.29 CHRONIC SHOULDER PAIN, UNSPECIFIED LATERALITY: Primary | ICD-10-CM

## 2024-03-05 DIAGNOSIS — M25.519 CHRONIC SHOULDER PAIN, UNSPECIFIED LATERALITY: Primary | ICD-10-CM

## 2024-03-13 ENCOUNTER — DOCUMENTATION ONLY (OUTPATIENT)
Dept: PRIMARY CARE CLINIC | Facility: CLINIC | Age: 64
End: 2024-03-13
Payer: MEDICARE

## 2024-03-13 NOTE — PROGRESS NOTES
Physical therapy referral faxed to [facility] per patient request. Fax confirmation received.

## 2024-03-14 ENCOUNTER — TELEPHONE (OUTPATIENT)
Dept: PRIMARY CARE CLINIC | Facility: CLINIC | Age: 64
End: 2024-03-14
Payer: MEDICARE

## 2024-03-14 DIAGNOSIS — F43.9 STRESS: Primary | ICD-10-CM

## 2024-03-14 NOTE — TELEPHONE ENCOUNTER
Patient was informed of referral. I am not able to schedule her appointment. She verbalized understanding.

## 2024-03-14 NOTE — TELEPHONE ENCOUNTER
----- Message from Myesha Javier sent at 3/14/2024  2:41 PM CDT -----  Regarding: psych referral  [phone] - call back ; needing a referral to see a psychologist at the [facility].     Either Yudelka Gonzalez LCSW or ALINE Bush

## 2024-03-14 NOTE — TELEPHONE ENCOUNTER
Patient wants a referral to see a psychologist in [place] at the Psych clinic there for caregiver stress.

## 2024-03-26 ENCOUNTER — TELEPHONE (OUTPATIENT)
Dept: GASTROENTEROLOGY | Facility: CLINIC | Age: 64
End: 2024-03-26
Payer: MEDICARE

## 2024-03-26 NOTE — TELEPHONE ENCOUNTER
[facility]oke with pt. [facility] Pt verbalized understanding.

## 2024-03-26 NOTE — TELEPHONE ENCOUNTER
----- Message from Ottoniel Lin sent at 3/26/2024 10:22 AM CDT -----  Type:  Patient Returning Call    Who Called:pt  Who Left Message for Patient:  Does the patient know what this is regarding?: prep  Would the patient rather a call back or a response via [facility]? Call  Best Call Back Number: [phone]  Additional Information: pt states she has questions in regards to procedure instructions. Pt states she would like a call right away. Thank you

## 2024-03-27 ENCOUNTER — HOSPITAL ENCOUNTER (OUTPATIENT)
Facility: HOSPITAL | Age: 64
Discharge: HOME | End: 2024-03-27
Attending: INTERNAL MEDICINE | Admitting: INTERNAL MEDICINE
Payer: MEDICARE

## 2024-03-27 ENCOUNTER — ANESTHESIA (OUTPATIENT)
Dept: ENDOSCOPY | Facility: HOSPITAL | Age: 64
End: 2024-03-27
Payer: MEDICARE

## 2024-03-27 ENCOUNTER — ANESTHESIA EVENT (OUTPATIENT)
Dept: ENDOSCOPY | Facility: HOSPITAL | Age: 64
End: 2024-03-27
Payer: MEDICARE

## 2024-03-27 DIAGNOSIS — Z86.010 HX OF COLONIC POLYPS: ICD-10-CM

## 2024-03-27 PROCEDURE — 88305 TISSUE EXAM BY PATHOLOGIST: CPT | Mod: 26,,, | Performed by: PATHOLOGY

## 2024-03-27 PROCEDURE — 45385 COLONOSCOPY W/LESION REMOVAL: CPT | Mod: PT,PO | Performed by: INTERNAL MEDICINE

## 2024-03-27 PROCEDURE — 37000008 HC ANESTHESIA 1ST 15 MINUTES: Mod: PO | Performed by: INTERNAL MEDICINE

## 2024-03-27 PROCEDURE — D9220A PRA ANESTHESIA: Mod: PT,CRNA,, | Performed by: NURSE ANESTHETIST, CERTIFIED REGISTERED

## 2024-03-27 PROCEDURE — 27201089 HC SNARE, DISP (ANY): Mod: PO | Performed by: INTERNAL MEDICINE

## 2024-03-27 PROCEDURE — 63600175 PHARM REV CODE 636 W HCPCS: Mod: PO | Performed by: NURSE ANESTHETIST, CERTIFIED REGISTERED

## 2024-03-27 PROCEDURE — D9220A PRA ANESTHESIA: Mod: PT,ANES,, | Performed by: ANESTHESIOLOGY

## 2024-03-27 PROCEDURE — 88305 TISSUE EXAM BY PATHOLOGIST: CPT | Mod: PO | Performed by: PATHOLOGY

## 2024-03-27 PROCEDURE — 37000009 HC ANESTHESIA EA ADD 15 MINS: Mod: PO | Performed by: INTERNAL MEDICINE

## 2024-03-27 PROCEDURE — 63600175 PHARM REV CODE 636 W HCPCS: Mod: PO | Performed by: INTERNAL MEDICINE

## 2024-03-27 PROCEDURE — 25000003 PHARM REV CODE 250: Mod: PO | Performed by: NURSE ANESTHETIST, CERTIFIED REGISTERED

## 2024-03-27 PROCEDURE — 45385 COLONOSCOPY W/LESION REMOVAL: CPT | Mod: PT,,, | Performed by: INTERNAL MEDICINE

## 2024-03-27 RX ORDER — PROPOFOL 10 MG/ML
VIAL (ML) INTRAVENOUS
Status: DISCONTINUED | OUTPATIENT
Start: 2024-03-27 | End: 2024-03-27

## 2024-03-27 RX ORDER — SODIUM CHLORIDE, SODIUM LACTATE, POTASSIUM CHLORIDE, CALCIUM CHLORIDE 600; 310; 30; 20 MG/100ML; MG/100ML; MG/100ML; MG/100ML
INJECTION, SOLUTION INTRAVENOUS CONTINUOUS
Status: DISCONTINUED | OUTPATIENT
Start: 2024-03-27 | End: 2024-03-27 | Stop reason: HOSPADM

## 2024-03-27 RX ORDER — SODIUM CHLORIDE 0.9 % (FLUSH) 0.9 %
10 SYRINGE (ML) INJECTION
Status: DISCONTINUED | OUTPATIENT
Start: 2024-03-27 | End: 2024-03-27 | Stop reason: HOSPADM

## 2024-03-27 RX ORDER — LIDOCAINE HYDROCHLORIDE 20 MG/ML
INJECTION INTRAVENOUS
Status: DISCONTINUED | OUTPATIENT
Start: 2024-03-27 | End: 2024-03-27

## 2024-03-27 RX ADMIN — SODIUM CHLORIDE, POTASSIUM CHLORIDE, SODIUM LACTATE AND CALCIUM CHLORIDE: 600; 310; 30; 20 INJECTION, SOLUTION INTRAVENOUS at 11:03

## 2024-03-27 RX ADMIN — PROPOFOL 50 MG: 10 INJECTION, EMULSION INTRAVENOUS at 12:03

## 2024-03-27 RX ADMIN — PROPOFOL 100 MG: 10 INJECTION, EMULSION INTRAVENOUS at 12:03

## 2024-03-27 RX ADMIN — LIDOCAINE HYDROCHLORIDE 100 MG: 20 INJECTION INTRAVENOUS at 12:03

## 2024-03-27 NOTE — ANESTHESIA PREPROCEDURE EVALUATION
03/27/2024  Nina Menendez is a 63 y.o., female.      Pre-op Assessment    I have reviewed the Patient Summary Reports.     I have reviewed the Nursing Notes. I have reviewed the NPO Status.   I have reviewed the Medications.     Review of Systems  Anesthesia Hx:  No problems with previous Anesthesia                Social:  Former Smoker       Cardiovascular:  Cardiovascular Normal                                            Pulmonary:  Pulmonary Normal                       Psych:  Psychiatric History anxiety depression                Physical Exam  General: Well nourished    Airway:  Mallampati: II   Mouth Opening: Normal  TM Distance: Normal  Neck ROM: Normal ROM        Anesthesia Plan  Type of Anesthesia, risks & benefits discussed:    Anesthesia Type: Gen Natural Airway  Intra-op Monitoring Plan: Standard ASA Monitors  Induction:  IV  Informed Consent: Informed consent signed with the Patient and all parties understand the risks and agree with anesthesia plan.  All questions answered.   ASA Score: 2    Ready For Surgery From Anesthesia Perspective.     .

## 2024-03-27 NOTE — TRANSFER OF CARE
Anesthesia Transfer of Care Note    Patient: Nina Menendez    Procedure(s) Performed: Procedure(s) (LRB):  COLONOSCOPY (N/A)    Patient location: PACU    Anesthesia Type: general    Transport from OR: Transported from OR on room air with adequate spontaneous ventilation    Post pain: adequate analgesia    Post assessment: no apparent anesthetic complications and tolerated procedure well    Post vital signs: stable    Level of consciousness: sedated    Nausea/Vomiting: no nausea/vomiting    Complications: none    Transfer of care protocol was followed      Last vitals: Visit Vitals  /67   Pulse 75   Temp 36.2 °C (97.2 °F) (Skin)   Resp 18   LMP 11/14/2012   SpO2 100%   Breastfeeding No

## 2024-03-27 NOTE — DISCHARGE SUMMARY
[place] - Endoscopy  Discharge Note  Short Stay  Discharge Note  Short Stay      SUMMARY     Admit Date: 3/27/2024    Attending Physician: Oleksandr Drake MD     Discharge Physician: Oleksandr Drake MD    Discharge Date: 3/27/2024 1:00 PM    Final Diagnosis: History of colon polyps [Z86.010]    Disposition: HOME OR SELF CARE    Patient Instructions:   Current Discharge Medication List        CONTINUE these medications which have NOT CHANGED    Details   buPROPion (WELLBUTRIN SR) 150 MG TBSR 12 hr tablet Take 1 tablet (150 mg total) by mouth 2 (two) times daily.  Qty: 180 tablet, Refills: 1      nitrofurantoin (MACRODANTIN) 100 MG capsule TAKE ONE CAPSULE BY MOUTH ONCE DAILY AT BEDTIME AS NEEDED  Qty: 30 capsule, Refills: 2      sertraline (ZOLOFT) 100 MG tablet TAKE ONE & ONE-HALF TABLETS BY MOUTH ONCE DAILY  Qty: 135 tablet, Refills: 1             Discharge Procedure Orders (must include Diet, Follow-up, Activity)    Follow Up:  Follow up with PCP as previously scheduled  Resume routine diet.  Activity as tolerated.    No driving day of procedure.

## 2024-03-27 NOTE — PROVATION PATIENT INSTRUCTIONS
Discharge Summary/Instructions after an Endoscopic Procedure  Patient Name: Nina Shah  Patient MRN: [id number]  Patient YOB: 1960 Wednesday, March 27, 2024  Oleksandr Drake MD  Dear patient,  As a result of recent federal legislation (The Federal Cures Act), you may   receive lab or pathology results from your procedure in your [facility]   account before your physician is able to contact you. Your physician or   their representative will relay the results to you with their   recommendations at their soonest availability.  Thank you,  RESTRICTIONS:  During your procedure today, you received medications for sedation.  These   medications may affect your judgment, balance and coordination.  Therefore,   for 24 hours, you have the following restrictions:   - DO NOT drive a car, operate machinery, make legal/financial decisions,   sign important papers or drink alcohol.    ACTIVITY:  Today: no heavy lifting, straining or running due to procedural   sedation/anesthesia.  The following day: return to full activity including work.  DIET:  Eat and drink normally unless instructed otherwise.     TREATMENT FOR COMMON SIDE EFFECTS:  - Mild abdominal pain, nausea, belching, bloating or excessive gas:  rest,   eat lightly and use a heating pad.  - Sore Throat: treat with throat lozenges and/or gargle with warm salt   water.  - Because air was used during the procedure, expelling large amounts of air   from your rectum or belching is normal.  - If a bowel prep was taken, you may not have a bowel movement for 1-3 days.    This is normal.  SYMPTOMS TO WATCH FOR AND REPORT TO YOUR PHYSICIAN:  1. Abdominal pain or bloating, other than gas cramps.  2. Chest pain.  3. Back pain.  4. Signs of infection such as: chills or fever occurring within 24 hours   after the procedure.  5. Rectal bleeding, which would show as bright red, maroon, or black stools.   (A tablespoon of blood from the rectum is not serious,  especially if   hemorrhoids are present.)  6. Vomiting.  7. Weakness or dizziness.  GO DIRECTLY TO THE NEAREST EMERGENCY ROOM IF YOU HAVE ANY OF THE FOLLOWING:      Difficulty breathing              Chills and/or fever over 101 F   Persistent vomiting and/or vomiting blood   Severe abdominal pain   Severe chest pain   Black, tarry stools   Bleeding- more than one tablespoon   Any other symptom or condition that you feel may need urgent attention  Your doctor recommends these additional instructions:  If any biopsies were taken, your doctors clinic will contact you in 1 to 2   weeks with any results.  We are waiting for your pathology results.   Your physician has recommended a repeat colonoscopy in five years for   surveillance based on pathology results.   You are being discharged to home.  For questions, problems or results please call your physician - Oleksandr Drake MD at Work:  [phone].  EMERGENCY PHONE NUMBER: [phone], LAB RESULTS: [phone]  IF A COMPLICATION OR EMERGENCY SITUATION ARISES AND YOU ARE UNABLE TO REACH   YOUR PHYSICIAN - GO DIRECTLY TO THE EMERGENCY ROOM.  ___________________________________________  Nurse Signature  ___________________________________________  Patient/Designated Responsible Party Signature  Oleksandr Drake MD  3/27/2024 12:59:09 PM  This report has been verified and signed electronically.  Dear patient,  As a result of recent federal legislation (The Federal Cures Act), you may   receive lab or pathology results from your procedure in your [facility]   account before your physician is able to contact you. Your physician or   their representative will relay the results to you with their   recommendations at their soonest availability.  Thank you.  PROVATION

## 2024-03-27 NOTE — ANESTHESIA POSTPROCEDURE EVALUATION
Anesthesia Post Evaluation    Patient: Nina Menendez    Procedure(s) Performed: Procedure(s) (LRB):  COLONOSCOPY (N/A)    Final Anesthesia Type: general      Patient location during evaluation: PACU  Patient participation: Yes- Able to Participate  Level of consciousness: awake and alert  Post-procedure vital signs: reviewed and stable  Pain management: adequate  Airway patency: patent    PONV status at discharge: No PONV  Anesthetic complications: no      Cardiovascular status: blood pressure returned to baseline  Respiratory status: unassisted  Hydration status: euvolemic  Follow-up not needed.              Vitals Value Taken Time   /68 03/27/24 1325   Temp  03/27/24 1418   Pulse 67 03/27/24 1325   Resp 18 03/27/24 1325   SpO2 100 % 03/27/24 1325         Event Time   Out of Recovery 13:35:41         Pain/Naseem Score: Naseem Score: 10 (3/27/2024  1:25 PM)

## 2024-03-28 VITALS
RESPIRATION RATE: 18 BRPM | TEMPERATURE: 97 F | OXYGEN SATURATION: 100 % | HEART RATE: 67 BPM | SYSTOLIC BLOOD PRESSURE: 118 MMHG | DIASTOLIC BLOOD PRESSURE: 68 MMHG

## 2024-04-01 LAB
FINAL PATHOLOGIC DIAGNOSIS: NORMAL
GROSS: NORMAL
Lab: NORMAL

## 2024-04-11 ENCOUNTER — OFFICE VISIT (OUTPATIENT)
Dept: PRIMARY CARE CLINIC | Facility: CLINIC | Age: 64
End: 2024-04-11
Payer: MEDICARE

## 2024-04-11 VITALS
OXYGEN SATURATION: 98 % | SYSTOLIC BLOOD PRESSURE: 112 MMHG | BODY MASS INDEX: 20.09 KG/M2 | HEIGHT: 66 IN | DIASTOLIC BLOOD PRESSURE: 70 MMHG | HEART RATE: 69 BPM | WEIGHT: 125 LBS

## 2024-04-11 DIAGNOSIS — F32.0 CURRENT MILD EPISODE OF MAJOR DEPRESSIVE DISORDER, UNSPECIFIED WHETHER RECURRENT: ICD-10-CM

## 2024-04-11 DIAGNOSIS — Z00.00 ENCOUNTER FOR PREVENTIVE HEALTH EXAMINATION: Primary | ICD-10-CM

## 2024-04-11 PROCEDURE — 1170F FXNL STATUS ASSESSED: CPT | Mod: CPTII,S$GLB,, | Performed by: PHYSICIAN ASSISTANT

## 2024-04-11 PROCEDURE — 1160F RVW MEDS BY RX/DR IN RCRD: CPT | Mod: CPTII,S$GLB,, | Performed by: PHYSICIAN ASSISTANT

## 2024-04-11 PROCEDURE — 99999 PR PBB SHADOW E&M-EST. PATIENT-LVL III: CPT | Mod: PBBFAC,,, | Performed by: PHYSICIAN ASSISTANT

## 2024-04-11 PROCEDURE — 1123F ACP DISCUSS/DSCN MKR DOCD: CPT | Mod: CPTII,S$GLB,, | Performed by: PHYSICIAN ASSISTANT

## 2024-04-11 PROCEDURE — 1101F PT FALLS ASSESS-DOCD LE1/YR: CPT | Mod: CPTII,S$GLB,, | Performed by: PHYSICIAN ASSISTANT

## 2024-04-11 PROCEDURE — 1126F AMNT PAIN NOTED NONE PRSNT: CPT | Mod: CPTII,S$GLB,, | Performed by: PHYSICIAN ASSISTANT

## 2024-04-11 PROCEDURE — 3078F DIAST BP <80 MM HG: CPT | Mod: CPTII,S$GLB,, | Performed by: PHYSICIAN ASSISTANT

## 2024-04-11 PROCEDURE — G0439 PPPS, SUBSEQ VISIT: HCPCS | Mod: S$GLB,,, | Performed by: PHYSICIAN ASSISTANT

## 2024-04-11 PROCEDURE — 3288F FALL RISK ASSESSMENT DOCD: CPT | Mod: CPTII,S$GLB,, | Performed by: PHYSICIAN ASSISTANT

## 2024-04-11 PROCEDURE — 1159F MED LIST DOCD IN RCRD: CPT | Mod: CPTII,S$GLB,, | Performed by: PHYSICIAN ASSISTANT

## 2024-04-11 PROCEDURE — 3074F SYST BP LT 130 MM HG: CPT | Mod: CPTII,S$GLB,, | Performed by: PHYSICIAN ASSISTANT

## 2024-04-11 NOTE — PATIENT INSTRUCTIONS
Counseling and Referral of Other Preventative  (Italic type indicates deductible and co-insurance are waived)    Patient Name: Nina Menendez  Today's Date: 4/11/2024    Health Maintenance       Date Due Completion Date    LDCT Lung Screen Never done ---    Pneumococcal Vaccines (Age 65+) (2 of 2 - PCV) 08/12/2023 10/29/2008    Lipid Panel 10/16/2024 10/16/2023    Mammogram 12/11/2024 12/11/2023    TETANUS VACCINE 03/09/2026 3/9/2016    DEXA Scan 10/16/2026 10/16/2023    Colorectal Cancer Screening 03/27/2029 3/27/2024        No orders of the defined types were placed in this encounter.    The following information is provided to all patients.  This information is to help you find resources for any of the problems found today that may be affecting your health:                  Living healthy guide: www.[facility].[place].[facility]      Understanding Diabetes: www.diabetes.org      Eating healthy: www.cdc.gov/healthyweight      [facility] home safety checklist: www.cdc.gov/steadi/patient.html      Agency on Aging: www.goea.[place].[facility]      Alcoholics anonymous (AA): www.aa.org      Physical Activity: www.selina.nih.gov/ly7ynrs      Tobacco use: www.quitwithusla.org

## 2024-04-11 NOTE — PROGRESS NOTES
"  Nina Menendez presented for an initial Medicare AWV today. The following components were reviewed and updated:    Medical history  Family History  Social history  Allergies and Current Medications  Health Risk Assessment  Health Maintenance  Care Team    **See Completed Assessments for Annual Wellness visit with in the encounter summary    The following assessments were completed:  Depression Screening  Cognitive function Screening  Timed Get Up Test  Whisper Test        Opioid documentation:      Patient does not have a current opioid prescription.          Vitals:    04/11/24 1007   BP: 112/70   BP Location: Left arm   Patient Position: Sitting   BP Method: Medium (Manual)   Pulse: 69   SpO2: 98%   Weight: 56.7 kg (125 lb)   Height: 5' 6" (1.676 m)     Body mass index is 20.18 kg/m².       Physical Exam  Vitals and nursing note reviewed.   Constitutional:       General: She is not in acute distress.     Appearance: Normal appearance. She is not ill-appearing.   HENT:      Head: Normocephalic and atraumatic.      Right Ear: External ear normal.      Left Ear: External ear normal.   Eyes:      General:         Right eye: No discharge.         Left eye: No discharge.      Extraocular Movements: Extraocular movements intact.      Conjunctiva/sclera: Conjunctivae normal.   Cardiovascular:      Rate and Rhythm: Normal rate and regular rhythm.      Heart sounds: Normal heart sounds. No murmur heard.  Pulmonary:      Effort: Pulmonary effort is normal. No respiratory distress.   Skin:     General: Skin is warm and dry.      Coloration: Skin is not jaundiced or pale.   Neurological:      General: No focal deficit present.      Mental Status: She is alert.   Psychiatric:         Mood and Affect: Mood normal.         Behavior: Behavior normal.         Thought Content: Thought content normal.         Judgment: Judgment normal.           Diagnoses and health risks identified today and associated " recommendations/orders:  1. Encounter for preventive health examination  AWV completed today  Patient believes that she got pneumococcal vaccine at outside pharmacy, will check with pharmacy prior to next visit with PCP.  Willing to receive vaccine at next visit if not up-to-date.    2. Current mild episode of major depressive disorder, unspecified whether recurrent  Stable  Currently on Wellbutrin.  Followed by PCP.      Provided Nina with a 5-10 year written screening schedule and personal prevention plan. Recommendations were developed using the USPSTF age appropriate recommendations. Education, counseling, and referrals were provided as needed.  After Visit Summary printed and given to patient which includes a list of additional screenings\tests needed.    No follow-ups on file.      EDUARD Hebert  I offered to discuss advanced care planning, including how to pick a person who would make decisions for you if you were unable to make them for yourself, called a health care power of , and what kind of decisions you might make such as use of life sustaining treatments such as ventilators and tube feeding when faced with a life limiting illness recorded on a living will that they will need to know. (How you want to be cared for as you near the end of your natural life)     X  Patient has advanced directives on file, which we reviewed, and they do not wish to make changes.

## 2024-04-15 RX ORDER — BUPROPION HYDROCHLORIDE 150 MG/1
150 TABLET, EXTENDED RELEASE ORAL 2 TIMES DAILY
Qty: 180 TABLET | Refills: 3 | Status: SHIPPED | OUTPATIENT
Start: 2024-04-15

## 2024-04-16 ENCOUNTER — OFFICE VISIT (OUTPATIENT)
Dept: PRIMARY CARE CLINIC | Facility: CLINIC | Age: 64
End: 2024-04-16
Payer: MEDICARE

## 2024-04-16 VITALS
DIASTOLIC BLOOD PRESSURE: 68 MMHG | OXYGEN SATURATION: 98 % | HEART RATE: 73 BPM | BODY MASS INDEX: 20.67 KG/M2 | HEIGHT: 66 IN | WEIGHT: 128.63 LBS | SYSTOLIC BLOOD PRESSURE: 114 MMHG

## 2024-04-16 DIAGNOSIS — D72.820 LYMPHOCYTOSIS: ICD-10-CM

## 2024-04-16 DIAGNOSIS — Z13.6 SCREENING FOR CARDIOVASCULAR CONDITION: ICD-10-CM

## 2024-04-16 DIAGNOSIS — F41.9 ANXIETY: ICD-10-CM

## 2024-04-16 DIAGNOSIS — N18.31 STAGE 3A CHRONIC KIDNEY DISEASE: ICD-10-CM

## 2024-04-16 DIAGNOSIS — F32.0 CURRENT MILD EPISODE OF MAJOR DEPRESSIVE DISORDER, UNSPECIFIED WHETHER RECURRENT: Primary | ICD-10-CM

## 2024-04-16 DIAGNOSIS — R53.83 FATIGUE, UNSPECIFIED TYPE: ICD-10-CM

## 2024-04-16 DIAGNOSIS — M25.519 CHRONIC SHOULDER PAIN, UNSPECIFIED LATERALITY: ICD-10-CM

## 2024-04-16 DIAGNOSIS — G89.29 CHRONIC SHOULDER PAIN, UNSPECIFIED LATERALITY: ICD-10-CM

## 2024-04-16 DIAGNOSIS — Z87.891 HISTORY OF NICOTINE DEPENDENCE: ICD-10-CM

## 2024-04-16 PROCEDURE — 99214 OFFICE O/P EST MOD 30 MIN: CPT | Mod: S$GLB,,, | Performed by: INTERNAL MEDICINE

## 2024-04-16 PROCEDURE — 3288F FALL RISK ASSESSMENT DOCD: CPT | Mod: CPTII,S$GLB,, | Performed by: INTERNAL MEDICINE

## 2024-04-16 PROCEDURE — 1159F MED LIST DOCD IN RCRD: CPT | Mod: CPTII,S$GLB,, | Performed by: INTERNAL MEDICINE

## 2024-04-16 PROCEDURE — 1157F ADVNC CARE PLAN IN RCRD: CPT | Mod: CPTII,S$GLB,, | Performed by: INTERNAL MEDICINE

## 2024-04-16 PROCEDURE — 3078F DIAST BP <80 MM HG: CPT | Mod: CPTII,S$GLB,, | Performed by: INTERNAL MEDICINE

## 2024-04-16 PROCEDURE — 99999 PR PBB SHADOW E&M-EST. PATIENT-LVL III: CPT | Mod: PBBFAC,,, | Performed by: INTERNAL MEDICINE

## 2024-04-16 PROCEDURE — 1101F PT FALLS ASSESS-DOCD LE1/YR: CPT | Mod: CPTII,S$GLB,, | Performed by: INTERNAL MEDICINE

## 2024-04-16 PROCEDURE — 1160F RVW MEDS BY RX/DR IN RCRD: CPT | Mod: CPTII,S$GLB,, | Performed by: INTERNAL MEDICINE

## 2024-04-16 PROCEDURE — 3008F BODY MASS INDEX DOCD: CPT | Mod: CPTII,S$GLB,, | Performed by: INTERNAL MEDICINE

## 2024-04-16 PROCEDURE — 3074F SYST BP LT 130 MM HG: CPT | Mod: CPTII,S$GLB,, | Performed by: INTERNAL MEDICINE

## 2024-04-16 PROCEDURE — 1126F AMNT PAIN NOTED NONE PRSNT: CPT | Mod: CPTII,S$GLB,, | Performed by: INTERNAL MEDICINE

## 2024-04-16 RX ORDER — TRAZODONE HYDROCHLORIDE 50 MG/1
50 TABLET ORAL NIGHTLY
Qty: 90 TABLET | Refills: 0 | Status: SHIPPED | OUTPATIENT
Start: 2024-04-16 | End: 2025-04-16

## 2024-04-16 RX ORDER — ROSUVASTATIN CALCIUM 10 MG/1
10 TABLET, COATED ORAL DAILY
Qty: 90 TABLET | Refills: 3 | Status: SHIPPED | OUTPATIENT
Start: 2024-04-16 | End: 2025-04-16

## 2024-04-16 NOTE — PROGRESS NOTES
"INTERNAL MEDICINE PROGRESS/URGENT CARE NOTE    CHIEF COMPLAINT     Chief Complaint   Patient presents with    Follow-up     6 month follow up       HPI     Nina Menendez is a very shekhar 63 y.o.  female who presents with a PMHx of  anxiety and depression, HLD, insomnia, who presents today for her routine follow up visit.      Her main complaints and concerns are: ongoing isomnia. Has tried the melatoin and mag and has trouble staying asleep. Goes to bed around 10:30. Wakes up at 1 am and then stays up sometimes until maybe 3-5 am then finally wakes up around 7:30am. No notable apneic episodes.      Insomnia: sometimes trouble falling asleep and sometime trouble staying asleep.      Anxiety: says her mood is "good". Gets lonely when her daughter leaves on Sunday. But one of her twins live with her. used to take zoloft for depression. Says not sure when this was stopped. Now takes wellbutrin  takes zoloft 100mg daily.   GAD7 is 7/21  Denies HI/SI   PHQ 9 is 5/27      HLD: no recent labs on file.      Insomnia: Says her sleep is 'not good"   Has trouble with sleep initiation/maintenance     Smoking history. Chapito smoked 20 years ago. Then during covid smoked for a short while,. But has since stopped         Home Medications:  Prior to Admission medications    Medication Sig Start Date End Date Taking? Authorizing Provider   buPROPion (WELLBUTRIN SR) 150 MG TBSR 12 hr tablet Take 1 tablet by mouth twice daily 4/15/24   Heydi Giordano MD   nitrofurantoin (MACRODANTIN) 100 MG capsule TAKE ONE CAPSULE BY MOUTH ONCE DAILY AT BEDTIME AS NEEDED 6/7/17   Marisa Truong NP   sertraline (ZOLOFT) 100 MG tablet TAKE ONE & ONE-HALF TABLETS BY MOUTH ONCE DAILY  Patient not taking: Reported on 10/16/2023 4/20/17   Marisa Truong NP       Review of Systems:  Review of Systems          PHYSICAL EXAM     /68 (BP Location: Right arm, Patient Position: Sitting, BP Method: Medium (Manual))   Pulse 73   Ht " "5' 6" (1.676 m)   Wt 58.3 kg (128 lb 10.2 oz)   LMP 11/14/2012   SpO2 98%   BMI 20.76 kg/m²     GEN - A+OX4, NAD   HEENT - PERRL, EOMI, OP clear  Neck - No thyromegaly or cervical LAD. No thyroid masses felt.  CV - RRR, no m/r   Chest - CTAB, no wheezing or rhonchi  Abd - S/NT/ND/+BS.   Ext - 2+BDP and radial pulses. No C/C/E.  Skin - No rash.    LABS       ASSESSMENT/PLAN     Nina Menendez is a 63 y.o. female with  1. Current mild episode of major depressive disorder, unspecified whether recurrent  Overview:  PHQ 9 not done today. Mood is good with wellbutrin. Used to be on zoloft but no more.   Denies SI/HI      2. Anxiety    Overview:  GAD7 not done today. Mood is good with wellbutrin. Used to be on zoloft but no more.   Denies SI/HI      3. Lymphocytosis  Resolved on most recent blood work   -     CBC Auto Differential; Future; Expected date: 04/16/2024    4. Chronic shoulder pain, unspecified laterality  Improved with PT excercises    5. Fatigue, unspecified type  -     CBC Auto Differential; Future; Expected date: 04/16/2024  -     Comprehensive Metabolic Panel; Future; Expected date: 04/16/2024  -     TSH; Future; Expected date: 04/16/2024    6. Screening for cardiovascular condition  -     Lipid Panel; Future; Expected date: 04/16/2024    7. History of nicotine dependence  -     CT Chest Lung Screening Low Dose; Future; Expected date: 04/16/2024    8. Stage 3a chronic kidney disease  -     Basic Metabolic Panel; Future; Expected date: 04/16/2024    Other orders  -     rosuvastatin (CRESTOR) 10 MG tablet; Take 1 tablet (10 mg total) by mouth once daily.  Dispense: 90 tablet; Refill: 3           WORRY SCORE 1    RTC in 6 months, sooner if needed and depending on labs.    Heydi Giordano MD  Board Certified Internist/Geriatrician  [facility] ([place])      "

## 2024-04-17 ENCOUNTER — TELEPHONE (OUTPATIENT)
Dept: PRIMARY CARE CLINIC | Facility: CLINIC | Age: 64
End: 2024-04-17
Payer: MEDICARE

## 2024-04-17 ENCOUNTER — LAB VISIT (OUTPATIENT)
Dept: LAB | Facility: HOSPITAL | Age: 64
End: 2024-04-17
Attending: INTERNAL MEDICINE
Payer: MEDICARE

## 2024-04-17 DIAGNOSIS — N18.31 STAGE 3A CHRONIC KIDNEY DISEASE: ICD-10-CM

## 2024-04-17 LAB
ANION GAP SERPL CALC-SCNC: 7 MMOL/L (ref 8–16)
BUN SERPL-MCNC: 19 MG/DL (ref 8–23)
CALCIUM SERPL-MCNC: 9.1 MG/DL (ref 8.7–10.5)
CHLORIDE SERPL-SCNC: 109 MMOL/L (ref 95–110)
CO2 SERPL-SCNC: 25 MMOL/L (ref 23–29)
CREAT SERPL-MCNC: 1 MG/DL (ref 0.5–1.4)
EST. GFR  (NO RACE VARIABLE): >60 ML/MIN/1.73 M^2
GLUCOSE SERPL-MCNC: 81 MG/DL (ref 70–110)
POTASSIUM SERPL-SCNC: 4.1 MMOL/L (ref 3.5–5.1)
SODIUM SERPL-SCNC: 141 MMOL/L (ref 136–145)

## 2024-04-17 PROCEDURE — 36415 COLL VENOUS BLD VENIPUNCTURE: CPT | Mod: PO | Performed by: INTERNAL MEDICINE

## 2024-04-17 PROCEDURE — 80048 BASIC METABOLIC PNL TOTAL CA: CPT | Performed by: INTERNAL MEDICINE

## 2024-04-17 NOTE — TELEPHONE ENCOUNTER
----- Message from Heydi Giordano MD sent at 4/17/2024  3:41 PM CDT -----  Renal function back to normal. Great news. GFR is again back at 60. Be consistent with fluid intake. As I'm completely unsure what happened there but again with normal renal function.

## 2024-04-17 NOTE — PROGRESS NOTES
Renal function back to normal. Great news. GFR is again back at 60. Be consistent with fluid intake. As I'm completely unsure what happened there but again with normal renal function.

## 2024-04-23 ENCOUNTER — LAB VISIT (OUTPATIENT)
Dept: LAB | Facility: HOSPITAL | Age: 64
End: 2024-04-23
Payer: MEDICARE

## 2024-04-23 ENCOUNTER — TELEPHONE (OUTPATIENT)
Dept: PRIMARY CARE CLINIC | Facility: CLINIC | Age: 64
End: 2024-04-23
Payer: MEDICARE

## 2024-04-23 DIAGNOSIS — R30.0 DYSURIA: ICD-10-CM

## 2024-04-23 DIAGNOSIS — R30.0 DYSURIA: Primary | ICD-10-CM

## 2024-04-23 LAB
BACTERIA #/AREA URNS AUTO: ABNORMAL /HPF
BILIRUB UR QL STRIP: NEGATIVE
CLARITY UR REFRACT.AUTO: CLEAR
COLOR UR AUTO: YELLOW
GLUCOSE UR QL STRIP: NEGATIVE
HGB UR QL STRIP: ABNORMAL
KETONES UR QL STRIP: NEGATIVE
LEUKOCYTE ESTERASE UR QL STRIP: ABNORMAL
MICROSCOPIC COMMENT: ABNORMAL
NITRITE UR QL STRIP: NEGATIVE
PH UR STRIP: 6 [PH] (ref 5–8)
PROT UR QL STRIP: NEGATIVE
RBC #/AREA URNS AUTO: 0 /HPF (ref 0–4)
SP GR UR STRIP: 1.01 (ref 1–1.03)
SQUAMOUS #/AREA URNS AUTO: 3 /HPF
URN SPEC COLLECT METH UR: ABNORMAL
WBC #/AREA URNS AUTO: 20 /HPF (ref 0–5)

## 2024-04-23 PROCEDURE — 87086 URINE CULTURE/COLONY COUNT: CPT | Performed by: INTERNAL MEDICINE

## 2024-04-23 PROCEDURE — 81001 URINALYSIS AUTO W/SCOPE: CPT | Performed by: INTERNAL MEDICINE

## 2024-04-23 NOTE — TELEPHONE ENCOUNTER
Patient c/o uti symptoms. She says she feels like she has to go all the time, but doesn't. She also has pelvic pressure. She will go to the lab in [place] to give a urine sample.

## 2024-04-24 RX ORDER — AMOXICILLIN AND CLAVULANATE POTASSIUM 875; 125 MG/1; MG/1
1 TABLET, FILM COATED ORAL 2 TIMES DAILY
Qty: 10 TABLET | Refills: 0 | Status: SHIPPED | OUTPATIENT
Start: 2024-04-24 | End: 2024-04-29

## 2024-04-24 NOTE — TELEPHONE ENCOUNTER
Patient was informed of her ua results and that Dr. Giordano has sent in abx. She verbalized understanding.

## 2024-04-25 LAB — BACTERIA UR CULT: NO GROWTH

## 2024-04-29 ENCOUNTER — PATIENT MESSAGE (OUTPATIENT)
Dept: PRIMARY CARE CLINIC | Facility: CLINIC | Age: 64
End: 2024-04-29
Payer: MEDICARE

## 2024-05-08 ENCOUNTER — HOSPITAL ENCOUNTER (OUTPATIENT)
Dept: RADIOLOGY | Facility: HOSPITAL | Age: 64
Discharge: HOME | End: 2024-05-08
Attending: INTERNAL MEDICINE
Payer: MEDICARE

## 2024-05-08 ENCOUNTER — TELEPHONE (OUTPATIENT)
Dept: PRIMARY CARE CLINIC | Facility: CLINIC | Age: 64
End: 2024-05-08
Payer: MEDICARE

## 2024-05-08 DIAGNOSIS — Z87.891 HISTORY OF NICOTINE DEPENDENCE: ICD-10-CM

## 2024-05-08 PROBLEM — I70.0 ATHEROSCLEROSIS OF AORTA: Status: ACTIVE | Noted: 2024-05-08

## 2024-05-08 PROCEDURE — 71271 CT THORAX LUNG CANCER SCR C-: CPT | Mod: 26,,, | Performed by: STUDENT IN AN ORGANIZED HEALTH CARE EDUCATION/TRAINING PROGRAM

## 2024-05-08 PROCEDURE — 71271 CT THORAX LUNG CANCER SCR C-: CPT | Mod: TC,PO

## 2024-05-08 NOTE — TELEPHONE ENCOUNTER
Patient was informed of her CT results and Dr. Giordano's comments. She states her maternal grandmother may have had heart disease. She will ask her mother about this and call us back.

## 2024-05-08 NOTE — PROGRESS NOTES
CT chest shows minor nodules that require no further evaluation. No other notably abnormal finding.   Incidentally notes some cholesterol in your main blood vessel called aorta, and some coronaries. Hence the statin we started was ABSOLUTELY necessary. Continue. Continue with cardio. And please inquire about family history of heart disease. If so, we may consider a calcium cardiac scoring to see the degree of coronary  blockage.

## 2024-05-08 NOTE — TELEPHONE ENCOUNTER
----- Message from Heydi Giordano MD sent at 5/8/2024  9:43 AM CDT -----  CT chest shows minor nodules that require no further evaluation. No other notably abnormal finding.   Incidentally notes some cholesterol in your main blood vessel called aorta, and some coronaries. Hence the statin we started was ABSOLUTELY necessary. Continue. Continue with cardio. And please inquire about family history of heart disease. If so, we   may consider a calcium cardiac scoring to see the degree of coronary  blockage.

## 2024-07-09 RX ORDER — TRAZODONE HYDROCHLORIDE 50 MG/1
50 TABLET ORAL NIGHTLY
Qty: 90 TABLET | Refills: 1 | Status: SHIPPED | OUTPATIENT
Start: 2024-07-09

## 2024-07-29 DIAGNOSIS — R30.0 DYSURIA: Primary | ICD-10-CM

## 2024-07-30 RX ORDER — NITROFURANTOIN (MACROCRYSTALS) 100 MG/1
100 CAPSULE ORAL DAILY PRN
Qty: 30 CAPSULE | Refills: 3 | Status: SHIPPED | OUTPATIENT
Start: 2024-07-30

## 2024-07-30 NOTE — TELEPHONE ENCOUNTER
Patient states she takes macrobid after she has intercourse because she always gets a uti after sex. She denies any symptoms but says she knows when she is going to get a uti. She says she went to a urologist for 25 years and that was what he had prescribed. Patient will be here later today with her mother for her mother's appt.

## 2024-09-03 NOTE — H&P
History & Physical - Short Stay  Gastroenterology      SUBJECTIVE:     Procedure: Colonoscopy    Chief Complaint/Indication for Procedure: Previous Polyps    PTA Medications   Medication Sig    buPROPion (WELLBUTRIN SR) 150 MG TBSR 12 hr tablet Take 1 tablet (150 mg total) by mouth 2 (two) times daily.    nitrofurantoin (MACRODANTIN) 100 MG capsule TAKE ONE CAPSULE BY MOUTH ONCE DAILY AT BEDTIME AS NEEDED    sertraline (ZOLOFT) 100 MG tablet TAKE ONE & ONE-HALF TABLETS BY MOUTH ONCE DAILY (Patient not taking: Reported on 10/16/2023)       Review of patient's allergies indicates:   Allergen Reactions    Demerol [meperidine] Itching        Past Medical History:   Diagnosis Date    Anxiety     ASCVD 10 YEAR RISK 1.9% 2017    Depression     Hyperlipemia     in the 90s    Insomnia     S/P colonoscopy     ;  next due      Past Surgical History:   Procedure Laterality Date    FOOT SURGERY Bilateral     fracture coorrection in right, toe surgery on the left foot    FRACTURE SURGERY      R foot     OVARIAN CYST REMOVAL      TONSILLECTOMY       Family History   Problem Relation Age of Onset    Thyroid disease Mother     Hyperlipidemia Mother     Brain cancer Father     Hyperlipidemia Father     Drug abuse Brother     No Known Problems Brother     Hypertension Brother     Heart attack Maternal Grandmother     Arthritis Maternal Grandfather     Hyperlipidemia Paternal Grandmother     Diabetes Paternal Grandmother     Lung cancer Paternal Grandfather      Social History     Tobacco Use    Smoking status: Former     Current packs/day: 0.00     Average packs/day: 1.5 packs/day for 16.0 years (24.0 ttl pk-yrs)     Types: Cigarettes     Start date: 3/8/1979     Quit date: 3/8/1995     Years since quittin.0    Smokeless tobacco: Never   Substance Use Topics    Alcohol use: No     Comment: martini's/cocktails 1-2 xs a month    Drug use: No         OBJECTIVE:     Vital Signs (Most Recent)  Temp: 97.2 °F (36.2 °C)  Nurses Note -- 4 Eyes      9/3/2024   0700      Skin assessed during: Q Shift Change      [x] No Altered Skin Integrity Present    []Prevention Measures Documented      [] Yes- Altered Skin Integrity Present or Discovered   [] LDA Added if Not in Epic (Describe Wound)   [] New Altered Skin Integrity was Present on Admit and Documented in LDA   [] Wound Image Taken    Wound Care Consulted? No    Attending Nurse:  Susan Garcia RN/Staff Member:  Shahla           (03/27/24 1129)  Pulse: 65 (03/27/24 1149)  Resp: 16 (03/27/24 1149)  BP: 125/64 (03/27/24 1149)  SpO2: 100 % (03/27/24 1149)    Physical Exam:                                                       GENERAL:  Comfortable, in no acute distress.                                 HEENT EXAM:  Nonicteric.  No adenopathy.  Oropharynx is clear.               NECK:  Supple.                                                               LUNGS:  Clear.                                                               CARDIAC:  Regular rate and rhythm.  S1, S2.  No murmur.                      ABDOMEN:  Soft, positive bowel sounds, nontender.  No hepatosplenomegaly or masses.  No rebound or guarding.                                             EXTREMITIES:  No edema.     MENTAL STATUS:  Normal, alert and oriented.      ASSESSMENT/PLAN:     Assessment: Previous Polyps    Plan: Colonoscopy    Anesthesia Plan: General    ASA Grade: ASA 2 - Patient with mild systemic disease with no functional limitations    MALLAMPATI SCORE:  I (soft palate, uvula, fauces, and tonsillar pillars visible)

## 2024-10-16 ENCOUNTER — OFFICE VISIT (OUTPATIENT)
Dept: PRIMARY CARE CLINIC | Facility: CLINIC | Age: 64
End: 2024-10-16
Payer: MEDICARE

## 2024-10-16 VITALS
WEIGHT: 120.5 LBS | TEMPERATURE: 98 F | DIASTOLIC BLOOD PRESSURE: 70 MMHG | OXYGEN SATURATION: 99 % | HEART RATE: 65 BPM | HEIGHT: 66 IN | BODY MASS INDEX: 19.37 KG/M2 | SYSTOLIC BLOOD PRESSURE: 136 MMHG

## 2024-10-16 DIAGNOSIS — Z23 INFLUENZA VACCINE NEEDED: ICD-10-CM

## 2024-10-16 DIAGNOSIS — Z12.39 ENCOUNTER FOR SCREENING FOR MALIGNANT NEOPLASM OF BREAST, UNSPECIFIED SCREENING MODALITY: ICD-10-CM

## 2024-10-16 DIAGNOSIS — Z12.31 ENCOUNTER FOR SCREENING MAMMOGRAM FOR MALIGNANT NEOPLASM OF BREAST: ICD-10-CM

## 2024-10-16 DIAGNOSIS — D72.820 LYMPHOCYTOSIS: ICD-10-CM

## 2024-10-16 DIAGNOSIS — F32.0 CURRENT MILD EPISODE OF MAJOR DEPRESSIVE DISORDER, UNSPECIFIED WHETHER RECURRENT: Primary | ICD-10-CM

## 2024-10-16 DIAGNOSIS — Z63.6 CAREGIVER BURDEN: ICD-10-CM

## 2024-10-16 DIAGNOSIS — N18.31 STAGE 3A CHRONIC KIDNEY DISEASE: ICD-10-CM

## 2024-10-16 DIAGNOSIS — Z23 NEED FOR PROPHYLACTIC VACCINATION WITH STREPTOCOCCUS PNEUMONIAE (PNEUMOCOCCUS) AND INFLUENZA VACCINES: ICD-10-CM

## 2024-10-16 PROCEDURE — 99999 PR PBB SHADOW E&M-EST. PATIENT-LVL IV: CPT | Mod: PBBFAC,,, | Performed by: INTERNAL MEDICINE

## 2024-10-16 PROCEDURE — 90653 IIV ADJUVANT VACCINE IM: CPT | Mod: S$GLB,,, | Performed by: INTERNAL MEDICINE

## 2024-10-16 PROCEDURE — 1159F MED LIST DOCD IN RCRD: CPT | Mod: CPTII,S$GLB,, | Performed by: INTERNAL MEDICINE

## 2024-10-16 PROCEDURE — 90677 PCV20 VACCINE IM: CPT | Mod: S$GLB,,, | Performed by: INTERNAL MEDICINE

## 2024-10-16 PROCEDURE — 3078F DIAST BP <80 MM HG: CPT | Mod: CPTII,S$GLB,, | Performed by: INTERNAL MEDICINE

## 2024-10-16 PROCEDURE — 3288F FALL RISK ASSESSMENT DOCD: CPT | Mod: CPTII,S$GLB,, | Performed by: INTERNAL MEDICINE

## 2024-10-16 PROCEDURE — 1101F PT FALLS ASSESS-DOCD LE1/YR: CPT | Mod: CPTII,S$GLB,, | Performed by: INTERNAL MEDICINE

## 2024-10-16 PROCEDURE — G0009 ADMIN PNEUMOCOCCAL VACCINE: HCPCS | Mod: S$GLB,,, | Performed by: INTERNAL MEDICINE

## 2024-10-16 PROCEDURE — 1123F ACP DISCUSS/DSCN MKR DOCD: CPT | Mod: CPTII,S$GLB,, | Performed by: INTERNAL MEDICINE

## 2024-10-16 PROCEDURE — G0008 ADMIN INFLUENZA VIRUS VAC: HCPCS | Mod: S$GLB,,, | Performed by: INTERNAL MEDICINE

## 2024-10-16 PROCEDURE — 3008F BODY MASS INDEX DOCD: CPT | Mod: CPTII,S$GLB,, | Performed by: INTERNAL MEDICINE

## 2024-10-16 PROCEDURE — 1160F RVW MEDS BY RX/DR IN RCRD: CPT | Mod: CPTII,S$GLB,, | Performed by: INTERNAL MEDICINE

## 2024-10-16 PROCEDURE — 1126F AMNT PAIN NOTED NONE PRSNT: CPT | Mod: CPTII,S$GLB,, | Performed by: INTERNAL MEDICINE

## 2024-10-16 PROCEDURE — 99214 OFFICE O/P EST MOD 30 MIN: CPT | Mod: S$GLB,,, | Performed by: INTERNAL MEDICINE

## 2024-10-16 PROCEDURE — 3075F SYST BP GE 130 - 139MM HG: CPT | Mod: CPTII,S$GLB,, | Performed by: INTERNAL MEDICINE

## 2024-10-16 RX ORDER — AMOXICILLIN 500 MG
1 CAPSULE ORAL DAILY
COMMUNITY

## 2024-10-16 SDOH — SOCIAL DETERMINANTS OF HEALTH (SDOH): DEPENDENT RELATIVE NEEDING CARE AT HOME: Z63.6

## 2024-10-16 NOTE — PROGRESS NOTES
"INTERNAL MEDICINE PROGRESS/URGENT CARE NOTE    CHIEF COMPLAINT     Chief Complaint   Patient presents with    Follow-up     Patient is here for a 6 month follow up. She denies any current complaints.        HPI     Nina Menendez is a very shekhar 63 y.o.  female who presents with a PMHx of  anxiety and depression, HLD, insomnia, who presents today for her routine follow up visit.      Her main complaints and concerns are:daughter thinks she needs to see a therapist. 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 Has tried the melatoin and mag and has trouble staying asleep. Goes to bed around 10:30. Wakes up at 1 am and then stays up sometimes until maybe 3-5 am then finally wakes up around 7:30am. No notable apneic episodes. At her last visit, we tried tazodone, and today, she reports it did help with sleep but she does not sleep the whole night and wakes up around 3am. Thinks this has to do with being a caregiver.      Insomnia: sometimes trouble falling asleep and sometime trouble staying asleep.      Anxiety: says her mood is "good". Gets lonely when her daughter leaves on Sunday. But one of her twins live with her. used to take zoloft for depression. Says not sure when this was stopped. Now takes wellbutrin  takes zoloft 100mg daily.   GAD7 is 7/21  Denies HI/SI   PHQ 9 is 5/27      HLD: no recent labs on file.      Insomnia: Says her sleep is 'not good"   Has trouble with sleep initiation/maintenance     Smoking history. Chapito smoked 20 years ago. Then during covid smoked for a short while,. But has since stopped    Home Medications:  Prior to Admission medications    Medication Sig Start Date End Date Taking? Authorizing Provider   buPROPion (WELLBUTRIN SR) 150 MG TBSR 12 hr tablet Take 1 tablet by mouth twice daily 4/15/24   Heydi Giordano MD   nitrofurantoin (MACRODANTIN) 100 MG capsule Take 1 capsule " "(100 mg total) by mouth daily as needed (intercourse). 7/30/24   Heydi Giordano MD   rosuvastatin (CRESTOR) 10 MG tablet Take 1 tablet (10 mg total) by mouth once daily. 4/16/24 4/16/25  Heydi Giordano MD   traZODone (DESYREL) 50 MG tablet Take 1 tablet by mouth in the evening 7/9/24   Heydi Giordano MD       Review of Systems:  Review of Systems      Advance Care Planning     Date: 10/16/2024    Power of   I initiated the process of voluntary advance care planning today and explained the importance of this process to the patient.  I introduced the concept of advance directives to the patient, as well. Then the patient received detailed information about the importance of designating a Health Care Power of  (HCPOA). She was also instructed to communicate with this person about their wishes for future healthcare, should she become sick and lose decision-making capacity. The patient has previously appointed a HCPOA. After our discussion, the patient has decided to complete a HCPOA and has appointed her daughter, health care agent:  on f ile  & health care agent number:  on file . I encouraged her to communicate with this person about their wishes for future healthcare, should she become sick and lose decision-making capacity.      A total of 10 min was spent on advance care planning, goals of care discussion, emotional support, formulating and communicating prognosis and exploring burden/benefit of various approaches of treatment. This discussion occurred on a fully voluntary basis with the verbal consent of the patient and/or family.             PHYSICAL EXAM     /70 (BP Location: Left arm, Patient Position: Sitting)   Pulse 65   Temp 98 °F (36.7 °C) (Oral)   Ht 5' 6" (1.676 m)   Wt 54.6 kg (120 lb 7.7 oz)   LMP 11/14/2012   SpO2 99%   BMI 19.45 kg/m²     GEN - A+OX4, NAD   HEENT - PERRL, EOMI, OP clear  Neck - No thyromegaly or cervical LAD. No thyroid masses " felt.  CV - RRR, no m/r   Chest - CTAB, no wheezing or rhonchi  Abd - S/NT/ND/+BS.   Ext - 2+BDP and radial pulses. No C/C/E.  Skin - No rash.    LABS       ASSESSMENT/PLAN     Nina Menendez is a 64 y.o. female with  1. Current mild episode of major depressive disorder, unspecified whether recurrent  Doing well.just visited daughter in [place]  Overview:  PHQ 9 5/27. Mood is good with wellbutrin. Used to be on zoloft but no more.   Denies SI/HI      2. Lymphocytosis  Pending CBC. But stable     3. Stage 3a chronic kidney disease  Pending renal function    4. Encounter for screening for malignant neoplasm of breast, unspecified screening modality  -     Cancel: Mammo Digital Screening Bilat w/ Willie; Future; Expected date: 10/16/2024  -     Mammo Digital Screening Bilat w/ Willie; Future; Expected date: 10/16/2024    5. Encounter for screening mammogram for malignant neoplasm of breast  -     Cancel: Mammo Digital Screening Bilat w/ Willie; Future; Expected date: 10/16/2024  -     Mammo Digital Screening Bilat w/ Willie; Future; Expected date: 10/16/2024    6. Influenza vaccine needed  -     influenza (adjuvanted) (Fluad) 45 mcg/0.5 mL IM vaccine (> or = 64 yo) 0.5 mL    7. Need for prophylactic vaccination with Streptococcus pneumoniae (Pneumococcus) and Influenza vaccines  -     influenza (adjuvanted) (Fluad) 45 mcg/0.5 mL IM vaccine (> or = 64 yo) 0.5 mL  -     pneumoc 20-kassie conj-dip cr(PF) (PREVNAR-20 (PF)) injection Syrg 0.5 mL    8. Caregiver burden    -     Ambulatory referral/consult to Value Based Primary Care Behavioral Health; Future; Expected date: 10/23/2024           WORRY SCORE 1    RTC in 6 months, sooner if needed and depending on labs.    Heydi Giordano MD  Board Certified Internist/Geriatrician  [facility] ([place])

## 2024-10-17 ENCOUNTER — LAB VISIT (OUTPATIENT)
Dept: LAB | Facility: HOSPITAL | Age: 64
End: 2024-10-17
Attending: INTERNAL MEDICINE
Payer: MEDICARE

## 2024-10-17 DIAGNOSIS — D72.820 LYMPHOCYTOSIS: ICD-10-CM

## 2024-10-17 DIAGNOSIS — Z13.6 SCREENING FOR CARDIOVASCULAR CONDITION: ICD-10-CM

## 2024-10-17 DIAGNOSIS — R53.83 FATIGUE, UNSPECIFIED TYPE: ICD-10-CM

## 2024-10-17 LAB
ALBUMIN SERPL BCP-MCNC: 4.2 G/DL (ref 3.5–5.2)
ALP SERPL-CCNC: 74 U/L (ref 40–150)
ALT SERPL W/O P-5'-P-CCNC: 14 U/L (ref 10–44)
ANION GAP SERPL CALC-SCNC: 8 MMOL/L (ref 8–16)
AST SERPL-CCNC: 23 U/L (ref 10–40)
BASOPHILS # BLD AUTO: 0.04 K/UL (ref 0–0.2)
BASOPHILS NFR BLD: 0.6 % (ref 0–1.9)
BILIRUB SERPL-MCNC: 0.5 MG/DL (ref 0.1–1)
BUN SERPL-MCNC: 16 MG/DL (ref 8–23)
CALCIUM SERPL-MCNC: 9.8 MG/DL (ref 8.7–10.5)
CHLORIDE SERPL-SCNC: 106 MMOL/L (ref 95–110)
CHOLEST SERPL-MCNC: 166 MG/DL (ref 120–199)
CHOLEST/HDLC SERPL: 2.3 {RATIO} (ref 2–5)
CO2 SERPL-SCNC: 26 MMOL/L (ref 23–29)
CREAT SERPL-MCNC: 1 MG/DL (ref 0.5–1.4)
DIFFERENTIAL METHOD BLD: ABNORMAL
EOSINOPHIL # BLD AUTO: 0.2 K/UL (ref 0–0.5)
EOSINOPHIL NFR BLD: 2.7 % (ref 0–8)
ERYTHROCYTE [DISTWIDTH] IN BLOOD BY AUTOMATED COUNT: 13.6 % (ref 11.5–14.5)
EST. GFR  (NO RACE VARIABLE): >60 ML/MIN/1.73 M^2
GLUCOSE SERPL-MCNC: 90 MG/DL (ref 70–110)
HCT VFR BLD AUTO: 39.1 % (ref 37–48.5)
HDLC SERPL-MCNC: 72 MG/DL (ref 40–75)
HDLC SERPL: 43.4 % (ref 20–50)
HGB BLD-MCNC: 12.4 G/DL (ref 12–16)
IMM GRANULOCYTES # BLD AUTO: 0.01 K/UL (ref 0–0.04)
IMM GRANULOCYTES NFR BLD AUTO: 0.2 % (ref 0–0.5)
LDLC SERPL CALC-MCNC: 78.2 MG/DL (ref 63–159)
LYMPHOCYTES # BLD AUTO: 3.3 K/UL (ref 1–4.8)
LYMPHOCYTES NFR BLD: 52.4 % (ref 18–48)
MCH RBC QN AUTO: 28.2 PG (ref 27–31)
MCHC RBC AUTO-ENTMCNC: 31.7 G/DL (ref 32–36)
MCV RBC AUTO: 89 FL (ref 82–98)
MONOCYTES # BLD AUTO: 0.5 K/UL (ref 0.3–1)
MONOCYTES NFR BLD: 7.4 % (ref 4–15)
NEUTROPHILS # BLD AUTO: 2.3 K/UL (ref 1.8–7.7)
NEUTROPHILS NFR BLD: 36.7 % (ref 38–73)
NONHDLC SERPL-MCNC: 94 MG/DL
NRBC BLD-RTO: 0 /100 WBC
PLATELET # BLD AUTO: 231 K/UL (ref 150–450)
PMV BLD AUTO: 9.6 FL (ref 9.2–12.9)
POTASSIUM SERPL-SCNC: 4.5 MMOL/L (ref 3.5–5.1)
PROT SERPL-MCNC: 7 G/DL (ref 6–8.4)
RBC # BLD AUTO: 4.39 M/UL (ref 4–5.4)
SODIUM SERPL-SCNC: 140 MMOL/L (ref 136–145)
TRIGL SERPL-MCNC: 79 MG/DL (ref 30–150)
WBC # BLD AUTO: 6.37 K/UL (ref 3.9–12.7)

## 2024-10-17 PROCEDURE — 80061 LIPID PANEL: CPT | Performed by: INTERNAL MEDICINE

## 2024-10-17 PROCEDURE — 36415 COLL VENOUS BLD VENIPUNCTURE: CPT | Mod: PN | Performed by: INTERNAL MEDICINE

## 2024-10-17 PROCEDURE — 80053 COMPREHEN METABOLIC PANEL: CPT | Performed by: INTERNAL MEDICINE

## 2024-10-17 PROCEDURE — 85025 COMPLETE CBC W/AUTO DIFF WBC: CPT | Performed by: INTERNAL MEDICINE

## 2024-10-17 PROCEDURE — 84443 ASSAY THYROID STIM HORMONE: CPT | Performed by: INTERNAL MEDICINE

## 2024-10-18 ENCOUNTER — TELEPHONE (OUTPATIENT)
Dept: PRIMARY CARE CLINIC | Facility: CLINIC | Age: 64
End: 2024-10-18
Payer: MEDICARE

## 2024-10-18 DIAGNOSIS — D72.820 ELEVATED LYMPHOCYTES: Primary | ICD-10-CM

## 2024-10-18 LAB — TSH SERPL DL<=0.005 MIU/L-ACNC: 3.47 UIU/ML (ref 0.4–4)

## 2024-10-18 NOTE — TELEPHONE ENCOUNTER
----- Message from Heydi Giordano MD sent at 10/18/2024  7:58 AM CDT -----  Recent labs show:   CBC is normal with no evidence of anemia.That one lab that's abnormal MCHC is of little concern at this time. Lymphocyte count is again elevated. Did she end up seeing hematology last year? I know I referred her but cannot recall the outcome and can't seem to find a note from them per my note 10/17/23  Thyroid lab/function is within range   Cholesterol looks great ursula compared to last value. LDL was 169 (goal is less than 100) and is now 78. This marvelous!! Lets keep up whatever we're doing.   CMP shows normal lytes, normal liver and kidney function, calcium protein levels, glucose etc.     Overall, her labs look just great

## 2024-10-18 NOTE — TELEPHONE ENCOUNTER
Patient was informed of her lab results. She verbalized understanding. Patient states she did not see heme. I was not able to find the referral to hematology. I have pended one, but do not know what reason to put for it. I will call the patient back to schedule her with hematology.

## 2024-10-22 ENCOUNTER — TELEPHONE (OUTPATIENT)
Dept: PRIMARY CARE CLINIC | Facility: CLINIC | Age: 64
End: 2024-10-22
Payer: MEDICARE

## 2024-10-22 NOTE — TELEPHONE ENCOUNTER
Clinician contacted pt regarding referral for behavioral health. Contact attempted at [phone]. There was no answer and  voice mail was left asking her to return the call. Clinician will continue to follow up.

## 2024-10-29 ENCOUNTER — CLINICAL SUPPORT (OUTPATIENT)
Dept: PRIMARY CARE CLINIC | Facility: CLINIC | Age: 64
End: 2024-10-29
Payer: MEDICARE

## 2024-10-29 DIAGNOSIS — F32.A DEPRESSIVE DISORDER: ICD-10-CM

## 2024-10-29 DIAGNOSIS — Z63.6 CAREGIVER BURDEN: Primary | ICD-10-CM

## 2024-10-29 DIAGNOSIS — Z65.8 PSYCHOSOCIAL DISTRESS: ICD-10-CM

## 2024-10-29 DIAGNOSIS — F41.9 ANXIETY: ICD-10-CM

## 2024-10-29 PROCEDURE — 99499 UNLISTED E&M SERVICE: CPT | Mod: S$GLB,,, | Performed by: SOCIAL WORKER

## 2024-10-29 SDOH — SOCIAL DETERMINANTS OF HEALTH (SDOH): DEPENDENT RELATIVE NEEDING CARE AT HOME: Z63.6
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
## 2024-11-12 ENCOUNTER — CLINICAL SUPPORT (OUTPATIENT)
Dept: PRIMARY CARE CLINIC | Facility: CLINIC | Age: 64
End: 2024-11-12
Payer: MEDICARE

## 2024-11-12 DIAGNOSIS — Z65.8 PSYCHOSOCIAL DISTRESS: ICD-10-CM

## 2024-11-12 DIAGNOSIS — Z63.6 CAREGIVER BURDEN: Primary | ICD-10-CM

## 2024-11-12 PROCEDURE — 99499 UNLISTED E&M SERVICE: CPT | Mod: S$GLB,,, | Performed by: SOCIAL WORKER

## 2024-11-12 SDOH — SOCIAL DETERMINANTS OF HEALTH (SDOH): DEPENDENT RELATIVE NEEDING CARE AT HOME: Z63.6
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
## 2024-11-25 ENCOUNTER — CLINICAL SUPPORT (OUTPATIENT)
Dept: PRIMARY CARE CLINIC | Facility: CLINIC | Age: 64
End: 2024-11-25
Payer: MEDICARE

## 2024-11-25 DIAGNOSIS — Z65.8 PSYCHOSOCIAL DISTRESS: ICD-10-CM

## 2024-11-25 DIAGNOSIS — Z63.6 CAREGIVER BURDEN: Primary | ICD-10-CM

## 2024-11-25 PROCEDURE — 99499 UNLISTED E&M SERVICE: CPT | Mod: S$GLB,,, | Performed by: SOCIAL WORKER

## 2024-11-25 SDOH — SOCIAL DETERMINANTS OF HEALTH (SDOH): DEPENDENT RELATIVE NEEDING CARE AT HOME: Z63.6
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
## 2024-12-13 ENCOUNTER — CLINICAL SUPPORT (OUTPATIENT)
Dept: PRIMARY CARE CLINIC | Facility: CLINIC | Age: 64
End: 2024-12-13
Payer: MEDICARE

## 2024-12-13 DIAGNOSIS — Z65.8 PSYCHOSOCIAL DISTRESS: Primary | ICD-10-CM

## 2024-12-13 DIAGNOSIS — F41.9 ANXIETY: ICD-10-CM

## 2024-12-13 DIAGNOSIS — Z63.6 CAREGIVER BURDEN: ICD-10-CM

## 2024-12-13 PROCEDURE — 99499 UNLISTED E&M SERVICE: CPT | Mod: S$GLB,,, | Performed by: SOCIAL WORKER

## 2024-12-13 SDOH — SOCIAL DETERMINANTS OF HEALTH (SDOH): DEPENDENT RELATIVE NEEDING CARE AT HOME: Z63.6
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
## 2024-12-19 DIAGNOSIS — F32.A DEPRESSIVE DISORDER: Primary | ICD-10-CM

## 2024-12-19 DIAGNOSIS — R30.0 DYSURIA: ICD-10-CM

## 2024-12-19 RX ORDER — TRAZODONE HYDROCHLORIDE 50 MG/1
50 TABLET ORAL NIGHTLY
Qty: 90 TABLET | Refills: 0 | Status: CANCELLED | OUTPATIENT
Start: 2024-12-19

## 2024-12-19 RX ORDER — TRAZODONE HYDROCHLORIDE 50 MG/1
50 TABLET ORAL NIGHTLY
Qty: 90 TABLET | Refills: 0 | Status: SHIPPED | OUTPATIENT
Start: 2024-12-19

## 2024-12-20 RX ORDER — NITROFURANTOIN (MACROCRYSTALS) 100 MG/1
100 CAPSULE ORAL DAILY PRN
Qty: 30 CAPSULE | Refills: 2 | Status: SHIPPED | OUTPATIENT
Start: 2024-12-20 | End: 2025-03-20

## 2024-12-20 RX ORDER — BUPROPION HYDROCHLORIDE 150 MG/1
150 TABLET, EXTENDED RELEASE ORAL 2 TIMES DAILY
Qty: 180 TABLET | Refills: 1 | Status: SHIPPED | OUTPATIENT
Start: 2024-12-20 | End: 2025-06-18

## 2024-12-20 NOTE — TELEPHONE ENCOUNTER
Refill Macrodantin and Wellbutrin to [facility] in [place].    LOV 10/16/24  NOV 4/16/25

## 2024-12-23 ENCOUNTER — OFFICE VISIT (OUTPATIENT)
Dept: OPTOMETRY | Facility: CLINIC | Age: 64
End: 2024-12-23
Payer: MEDICARE

## 2024-12-23 DIAGNOSIS — H52.4 HYPEROPIA WITH ASTIGMATISM AND PRESBYOPIA, BILATERAL: ICD-10-CM

## 2024-12-23 DIAGNOSIS — H52.03 HYPEROPIA WITH ASTIGMATISM AND PRESBYOPIA, BILATERAL: ICD-10-CM

## 2024-12-23 DIAGNOSIS — H43.393 VITREOUS FLOATERS, BILATERAL: ICD-10-CM

## 2024-12-23 DIAGNOSIS — Z13.5 GLAUCOMA SCREENING: ICD-10-CM

## 2024-12-23 DIAGNOSIS — H52.203 HYPEROPIA WITH ASTIGMATISM AND PRESBYOPIA, BILATERAL: ICD-10-CM

## 2024-12-23 DIAGNOSIS — H26.9 CORTICAL CATARACT OF BOTH EYES: ICD-10-CM

## 2024-12-23 DIAGNOSIS — H25.13 NUCLEAR SCLEROSIS, BILATERAL: Primary | ICD-10-CM

## 2024-12-23 PROCEDURE — 1157F ADVNC CARE PLAN IN RCRD: CPT | Mod: CPTII,S$GLB,, | Performed by: OPTOMETRIST

## 2024-12-23 PROCEDURE — 92015 DETERMINE REFRACTIVE STATE: CPT | Mod: S$GLB,,, | Performed by: OPTOMETRIST

## 2024-12-23 PROCEDURE — 99999 PR PBB SHADOW E&M-EST. PATIENT-LVL II: CPT | Mod: PBBFAC,,, | Performed by: OPTOMETRIST

## 2024-12-23 PROCEDURE — 1159F MED LIST DOCD IN RCRD: CPT | Mod: CPTII,S$GLB,, | Performed by: OPTOMETRIST

## 2024-12-23 PROCEDURE — 1126F AMNT PAIN NOTED NONE PRSNT: CPT | Mod: CPTII,S$GLB,, | Performed by: OPTOMETRIST

## 2024-12-23 PROCEDURE — 3288F FALL RISK ASSESSMENT DOCD: CPT | Mod: CPTII,S$GLB,, | Performed by: OPTOMETRIST

## 2024-12-23 PROCEDURE — 92004 COMPRE OPH EXAM NEW PT 1/>: CPT | Mod: S$GLB,,, | Performed by: OPTOMETRIST

## 2024-12-23 PROCEDURE — 1101F PT FALLS ASSESS-DOCD LE1/YR: CPT | Mod: CPTII,S$GLB,, | Performed by: OPTOMETRIST

## 2024-12-23 NOTE — PROGRESS NOTES
HPI    Annual 2020 (vision optique )    Pt complains of coating peeling off specs, needs updated spec Rx. Pt   denies floaters, flashes and gtts.   Last edited by Chaya Cantu on 12/23/2024  3:00 PM.            Assessment /Plan     For exam results, see Encounter Report.    Nuclear sclerosis, bilateral    Cortical cataract of both eyes    Vitreous floaters, bilateral    Glaucoma screening    Hyperopia with astigmatism and presbyopia, bilateral      1,2.   Vis sig NS and cortical cataracts, OU. Not ready for consult, gave info, cautions driving especially at night. CE possible in 2-4 yrs     3.   RD precautions given and reviewed. Patient knows to call/ message if any further changes in symptoms occur.  4.   Not suspect   5.   Updated specs rx gave copy, discussed slight changes OU ---message if questions     Discussed and educated patient on current findings /plan.  RTC 1 year, prn if any changes / issues

## 2024-12-23 NOTE — PATIENT INSTRUCTIONS
"DRY EYES -- BURNING OR SINDY SYMPTOMS:  Use Over The Counter artificial tears as needed for dry eye symptoms.   Some common brands include:  Systane, Optive, Refresh, and Thera-Tears.  These drops can be used as frequently as desired, but may be most helpful use during long periods of concentrated work.  For example, reading / working at the computer. Start with 3-4x per day.     Nighttime Ophthalmic gel or ointments are available: Refresh PM, Genteal, and Lacrilube.    Avoid drops that "get redness out" (Visine, Murine, Clear Eyes), as these may contain medication that could further irritate the eyes, especially with chronic use.    ALLERGY EYES -- ITCHING SYMPTOMS:  Over the counter medications include--Pataday, Zaditor, and Alaway.  Use as directed 1-2 drops daily for symptoms of itching / watering eyes.  These drops will not help for dry eye or exposure symptoms.    REDNESS RELIEF:  Lumify---is a good redness reliever that will not cause irritation if used chronically.        FLASHES / FLOATERS / POSTERIOR VITREOUS DETACHMENT    Call the clinic if you have any further changes in symptoms.  Including:  Increased numbers of floaters or flashing lights, dimness or darkness that moves through or stays constant in your vision, or any pain in the eye (s).    You may sometimes see small specks or clouds moving in your field of vision.  They are called FLOATERS.  You can often see them when looking at a plain background, like a blank wall or blue amanda.  Floaters are actually tiny clumps of gel or cells inside the VITREOUS, the clear jelly-like fluid that fills the inside of your eye.    While these objects look like they are in front of your eye, they are actually floating inside.  What you see are the shadows they cast on the RETINA, the nerve layer at the back of the eye that senses light and allows you to see.      POSTERIOR VITREOUS DETACHMENT    The appearance of new floaters may be alarming.  If you suddenly " develop new floaters, you should contact your eye care professional  right away.    The retina can tear if the shrinking vitreous pulls away from the wall of the eye.  This sometimes causes a small amount of bleeding in the eye that may appear as new floaters.    A torn retina is always a serious problem, since it can lead to a retinal detachment.  You should see your eye care professional as soon as possible if:    even one new floater appears suddenly;  you see sudden flashes of light;  you notice other symptoms, like the loss of side vision, or a curtain closes down in your vision        POSTERIOR VITREOUS DETACHMENT is more common for people who:    are nearsighted;  have had cataract surgery;  have had YAG laser surgery of the eye;  have had inflammation inside the eye;  are over age 60.      While some floaters may remain visible, many of them will fade over time and become less noticeable.  Even if you've had some floaters for years, you should have your eyes checked as soon as possible if you notice new ones.    FLASHING LIGHTS    When the vitreous gel rubs or pulls on the retina, you may see what look like flashing lights or lightning streaks.  These flashes can appear off and on for several weeks or months.      Some people experience flashes of light that appear as jagged lines or heat waves in both eyes, lasting 10-20 minutes.  These flashes are caused by a spasm of blood vessels in the brain, which is called a migraine.    If a headache follows these flashes, it's called a migraine headache.  If   no headache occurs, these flashes are called Ophthalmic or Ocular Migraine.           Early Cataracts--not visually significant for surgery consultation.    What Are Cataracts?  A clear lens in the eye focuses light. This lets the eye see images sharply. With age, the lens slowly becomes cloudy. The cloudy lens is a cataract. A cataract scatters light and makes it hard for the eye to focus. Cataracts often  form in both eyes. But one lens may cloud faster than the other.      The Aging of Your Lens    Your lens may cloud so slowly that you don`t notice any vision changes at first. But as the cataract gets worse, the eye has a harder time focusing. In early stages, glasses may help you see better. As the lens gets cloudier, your doctor may recommend surgery to restore your vision.

## 2025-01-03 ENCOUNTER — CLINICAL SUPPORT (OUTPATIENT)
Dept: PRIMARY CARE CLINIC | Facility: CLINIC | Age: 65
End: 2025-01-03
Payer: MEDICARE

## 2025-01-03 DIAGNOSIS — Z65.8 PSYCHOSOCIAL DISTRESS: Primary | ICD-10-CM

## 2025-01-03 DIAGNOSIS — Z63.6 CAREGIVER BURDEN: ICD-10-CM

## 2025-01-03 DIAGNOSIS — F41.9 ANXIETY: ICD-10-CM

## 2025-01-03 SDOH — SOCIAL DETERMINANTS OF HEALTH (SDOH): DEPENDENT RELATIVE NEEDING CARE AT HOME: Z63.6
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
## 2025-01-14 ENCOUNTER — CLINICAL SUPPORT (OUTPATIENT)
Dept: PRIMARY CARE CLINIC | Facility: CLINIC | Age: 65
End: 2025-01-14
Payer: MEDICARE

## 2025-01-14 DIAGNOSIS — F41.9 ANXIETY: ICD-10-CM

## 2025-01-14 DIAGNOSIS — Z65.8 PSYCHOSOCIAL DISTRESS: Primary | ICD-10-CM

## 2025-01-14 DIAGNOSIS — Z63.6 CAREGIVER BURDEN: ICD-10-CM

## 2025-01-14 PROCEDURE — 99499 UNLISTED E&M SERVICE: CPT | Mod: S$GLB,,, | Performed by: SOCIAL WORKER

## 2025-01-14 SDOH — SOCIAL DETERMINANTS OF HEALTH (SDOH): DEPENDENT RELATIVE NEEDING CARE AT HOME: Z63.6
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
## 2025-02-06 ENCOUNTER — TELEPHONE (OUTPATIENT)
Dept: PRIMARY CARE CLINIC | Facility: CLINIC | Age: 65
End: 2025-02-06
Payer: MEDICARE

## 2025-02-07 ENCOUNTER — TELEPHONE (OUTPATIENT)
Dept: HEMATOLOGY/ONCOLOGY | Facility: CLINIC | Age: 65
End: 2025-02-07
Payer: MEDICARE

## 2025-02-07 NOTE — TELEPHONE ENCOUNTER
LVM informing pt that there were currently no new pt appointments available. Recommended that pt reach out to other dermatology clinics for sooner availability     ----- Message from Ottoniel sent at 2/7/2025 12:54 PM CST -----  Type:  Patient Returning Call    Who Called:pt  Who Left Message for Patient:  Does the patient know what this is regarding?:NP appt  Would the patient rather a call back or a response via [facility]? call  Best Call Back Number: [phone]  Additional Information: pt states she would like to get an appt for a skin check. Pt states she would like a call back asap. Thank you

## 2025-02-13 ENCOUNTER — PATIENT MESSAGE (OUTPATIENT)
Dept: RESEARCH | Facility: HOSPITAL | Age: 65
End: 2025-02-13
Payer: MEDICARE

## 2025-02-14 ENCOUNTER — TELEPHONE (OUTPATIENT)
Dept: RESEARCH | Facility: HOSPITAL | Age: 65
End: 2025-02-14
Payer: MEDICARE

## 2025-02-14 NOTE — TELEPHONE ENCOUNTER
Called and left voicemail to follow up on a portal message sent on 13 February regarding the SURINDER research study.     Callback number: [phone]  Teresa, clinical research coordinator    Study title: GUILLERMINA CADENA  IRB #: [id number]

## 2025-02-18 ENCOUNTER — CLINICAL SUPPORT (OUTPATIENT)
Dept: PRIMARY CARE CLINIC | Facility: CLINIC | Age: 65
End: 2025-02-18
Payer: MEDICARE

## 2025-02-18 DIAGNOSIS — F41.9 ANXIETY: ICD-10-CM

## 2025-02-18 DIAGNOSIS — Z63.6 CAREGIVER BURDEN: ICD-10-CM

## 2025-02-18 DIAGNOSIS — Z65.8 PSYCHOSOCIAL DISTRESS: Primary | ICD-10-CM

## 2025-02-18 PROCEDURE — 99499 UNLISTED E&M SERVICE: CPT | Mod: S$GLB,,, | Performed by: SOCIAL WORKER

## 2025-02-18 SDOH — SOCIAL DETERMINANTS OF HEALTH (SDOH): DEPENDENT RELATIVE NEEDING CARE AT HOME: Z63.6

## 2025-02-18 NOTE — PROGRESS NOTES
Individual Psychotherapy (PhD/LCSW)    2/18/2025 -     Site:  [place] [address]      Chief complaint/reason for encounter: family stress    MENTAL HEALTH STATUS EXAM  General Appearance:  unremarkable, age appropriate   Speech: normal tone, normal rate, normal pitch, normal volume      Level of Cooperation: cooperative      Thought Processes: normal and logical   Mood: euthymic      Thought Content: normal, no suicidality, no homicidality, delusions, or paranoia   Affect: congruent and appropriate   Orientation: Oriented x3   Memory: Did not formally assess    Attention Span & Concentration: {Did not formally assess -  appears WNL   Fund of General Knowledge: Did not formally assess - appears WNL   Abstract Reasoning: Did not formally assess - appears WNL   Judgment & Insight: Did not formally assess - appears WNL     Language  Did not formally assess - appears WNL     Mood check: scale of:0 best, 10 worst. Patient rated:  Depression at - 3  Anxiety at - 3    Risk parameters:  Patient reports no suicidal ideation  Patient reports no homicidal ideation  Patient reports no self-injurious behavior  Patient reports no violent behavior    Bridge:  Pt stated she is working on setting boundaries     Review of home assignment:   N/A     [place]:  Self care   2.   Dealing with mood swings of others     History of present condition/content of session:   Pt began the session sharing continued px with mood issues with her daughter  and her mother. She said that her daughter was better over an incident that occurred recently. And in the past, she said her daughter would have had a melt down. Pt said that she, herself, responded better also.  She noted it was a good feeling for her to not have the need to react. She said she is learning to not say anything and not argue to debate, or explain, or rationalize with her mother.  She said she has some hope with daughter to change, but not her mother. She is realizing that mom's  personality is changing toward being meaner.  However, said she as much as her mother is starting to decline, her mother is still obsessively cleaning. Pt admitted that she has a hx of hoarding bx - she would like to become more organized. She talked about the shower curtains. Pt verbalized understanding that the key is recognizing that she is having this px.  Much of the session was spent exploring pt's hx anxious and sources of thoughts. Pt verbalized agreement that likely her mother's anxiety and unrealistic expectations are the cause of her anxious thoughts. Pt was asked: if she got rid of old clothes, what would happen???  Pt admitted she would feel uncomfortable. So, as a transition, she identified being willing to move them to a box in the garage.     Pertinent history:  Pt is second in the birth order. Her brother  by overdose 27 years ago. Pt has a hx of trauma, in college and from her marriage (now ). Pt reported problematic relationship with her mother. She has twin daughters, one dx'ed with mental illness, and a son.     Therapeutic Intervention:   Pt was assessed for present condition and areas of clinical concern. She was provided with supportive therapy. Active Listening was used in the session.  Coping skills and self care were discussed. Pt was given positive reinforcement for utilizing good problem solving and other strategies that reduce her stress. Help identifying the links to childhood experiences of current thoughts, feelings and/or  behaviors was also provided. Explored how family patterns of thoughts/feelings, or lack of, contribute and help sustain or reinforce dysfunctional bx. Including personality traits of family members who are main source of stress and roles in the alcoholic family.       Treatment plan:  Target symptoms:  Caregiver stress, anxiety, depression  Why chosen therapy is appropriate versus another modality: relevant to diagnosis, evidence based  practice  Outcome monitoring methods: self-report, checklist/rating scale  Therapeutic intervention type: insight oriented psychotherapy, supportive psychotherapy, CBT and DBT  Anxiety: reducing negative automatic thoughts and improving coping skills    Depression: reducing negative automatic thoughts and learning to set healthy boundaries.     Patient's response to intervention:  The patient's response to intervention is accepting.     Progress toward goals and other mental status changes:  The patient's progress toward goals is .  Goals:   - giving her daughter more responsibility in the household (foster more independence in her daugher)  - learn to say no to the request of others   - continue to set healthy boundaries with her mother     Diagnosis:   Caregiver Stress   R/O Generalized Anxiety Disorder   Psychosocial stressors     Plan:  individual psychotherapy  - clinician plans to terminate secondary to clinician leaving [facility]    Return to clinic:  3/3/2025 at 11:00    Length of Service (minutes):  45

## 2025-03-03 ENCOUNTER — CLINICAL SUPPORT (OUTPATIENT)
Dept: PRIMARY CARE CLINIC | Facility: CLINIC | Age: 65
End: 2025-03-03
Payer: MEDICARE

## 2025-03-03 DIAGNOSIS — F41.9 ANXIETY: ICD-10-CM

## 2025-03-03 DIAGNOSIS — Z63.6 CAREGIVER BURDEN: ICD-10-CM

## 2025-03-03 DIAGNOSIS — Z65.8 PSYCHOSOCIAL DISTRESS: Primary | ICD-10-CM

## 2025-03-03 PROCEDURE — 99499 UNLISTED E&M SERVICE: CPT | Mod: S$GLB,,, | Performed by: SOCIAL WORKER

## 2025-03-03 SDOH — SOCIAL DETERMINANTS OF HEALTH (SDOH): DEPENDENT RELATIVE NEEDING CARE AT HOME: Z63.6

## 2025-03-03 NOTE — PROGRESS NOTES
Individual Psychotherapy (PhD/LCSW)    3/3/2025 -     Site:  [place] [address]      Chief complaint/reason for encounter: family stress    MENTAL HEALTH STATUS EXAM  General Appearance:  unremarkable, age appropriate   Speech: normal tone, normal rate, normal pitch, normal volume      Level of Cooperation: cooperative      Thought Processes: normal and logical   Mood: euthymic      Thought Content: normal, no suicidality, no homicidality, delusions, or paranoia   Affect: congruent and appropriate   Orientation: Oriented x3   Memory: Did not formally assess    Attention Span & Concentration: {Did not formally assess -  appears WNL   Fund of General Knowledge: Did not formally assess - appears WNL   Abstract Reasoning: Did not formally assess - appears WNL   Judgment & Insight: Did not formally assess - appears WNL     Language  Did not formally assess - appears WNL     Mood check: scale of:0 best, 10 worst. Patient rated:  Depression at - 3  Anxiety at - 3    Risk parameters:  Patient reports no suicidal ideation  Patient reports no homicidal ideation  Patient reports no self-injurious behavior  Patient reports no violent behavior    Bridge:  Pt stated she is working on setting boundaries     Review of home assignment:   N/A     [place]:  Termination   Situation with her daughter     History of present condition/content of session:   This session focused on terminating with pt. Although clinician did not work with pt for very long, she did show improvement with setting healthy boundaries with her daughter and mother.   Pt acknowledged the difficulty of being in the middle of the 2 of them, each one with her own set of issues.     Other topics included pt safety when her daughter is violent or aggressive. She was encouraged to contact the 's office for an Order of Protective Custody if her daughter is threatening to harm pt or herself. Pt was given phone number and address of [facility] 's office and  instructions on going in person to file the order. Pt was assessed for associated thoughts/feelings. She was encouraged to follow up with another therapist and was given a list of providers pulled from [employer] website and encouraged to contact her insurance carrier for a list of providers.     Pertinent history:  Pt is second in the birth order. Her brother  by overdose 27 years ago. Pt has a hx of trauma, in college and from her marriage (now ). Pt reported problematic relationship with her mother. She has twin daughters, one dx'ed with mental illness, and a son.     Therapeutic Intervention:   Pt was assessed for present condition and areas of clinical concern. She was provided with supportive therapy. Active Listening was used in the session.  Coping skills and self care were discussed. Pt was given positive reinforcement for utilizing good problem solving and other strategies that reduce her stress. Help identifying the links to childhood experiences of current thoughts, feelings and/or  behaviors was also provided. Explored how family patterns of thoughts/feelings, or lack of, contribute and help sustain or reinforce dysfunctional bx. Including personality traits of family members who are main source of stress and roles in the alcoholic family.       Treatment plan:  Target symptoms:  Caregiver stress, anxiety, depression  Why chosen therapy is appropriate versus another modality: relevant to diagnosis, evidence based practice  Outcome monitoring methods: self-report, checklist/rating scale  Therapeutic intervention type: insight oriented psychotherapy, supportive psychotherapy, CBT and DBT  Anxiety: reducing negative automatic thoughts and improving coping skills    Depression: reducing negative automatic thoughts and learning to set healthy boundaries.     Patient's response to intervention:  The patient's response to intervention is accepting.     Progress toward goals and other mental  status changes:  The patient's progress toward goals is good.   Goals:   - giving her daughter more responsibility in the household (foster more independence in her daugher)  - learn to say no to the request of others   - continue to set healthy boundaries with her mother     Diagnosis:   Caregiver Stress   R/O Generalized Anxiety Disorder   Psychosocial stressors     Plan: Follow up with another therapist     Return to clinic:  N/A     Length of Service (minutes):  45

## 2025-03-06 RX ORDER — TRAZODONE HYDROCHLORIDE 50 MG/1
50 TABLET ORAL NIGHTLY
Qty: 90 TABLET | Refills: 0 | Status: SHIPPED | OUTPATIENT
Start: 2025-03-06

## 2025-03-24 DIAGNOSIS — Z00.00 ENCOUNTER FOR MEDICARE ANNUAL WELLNESS EXAM: ICD-10-CM

## 2025-03-26 RX ORDER — ROSUVASTATIN CALCIUM 10 MG/1
10 TABLET, COATED ORAL
Qty: 90 TABLET | Refills: 3 | Status: SHIPPED | OUTPATIENT
Start: 2025-03-26

## 2025-04-14 ENCOUNTER — TELEPHONE (OUTPATIENT)
Dept: PRIMARY CARE CLINIC | Facility: CLINIC | Age: 65
End: 2025-04-14
Payer: MEDICARE

## 2025-04-14 DIAGNOSIS — R53.83 FATIGUE, UNSPECIFIED TYPE: ICD-10-CM

## 2025-04-14 DIAGNOSIS — I70.0 ATHEROSCLEROSIS OF AORTA: Primary | ICD-10-CM

## 2025-04-14 DIAGNOSIS — D72.820 LYMPHOCYTOSIS: ICD-10-CM

## 2025-04-14 NOTE — TELEPHONE ENCOUNTER
Yes. Thank her for asking. There were a few labs I wanted to follow up on. Labs signed. Thank you

## 2025-04-14 NOTE — TELEPHONE ENCOUNTER
Ms. Menendez is scheduled to see you on 10/17/24 Wed. 4/16/25. She asked if she needs labs prior to her visit. Her last labs were 10/17/24.

## 2025-04-15 ENCOUNTER — LAB VISIT (OUTPATIENT)
Dept: LAB | Facility: HOSPITAL | Age: 65
End: 2025-04-15
Payer: MEDICARE

## 2025-04-15 DIAGNOSIS — R53.83 FATIGUE, UNSPECIFIED TYPE: ICD-10-CM

## 2025-04-15 DIAGNOSIS — I70.0 ATHEROSCLEROSIS OF AORTA: ICD-10-CM

## 2025-04-15 DIAGNOSIS — D72.820 LYMPHOCYTOSIS: ICD-10-CM

## 2025-04-15 LAB
ABSOLUTE EOSINOPHIL (OHS): 0.18 K/UL
ABSOLUTE MONOCYTE (OHS): 0.44 K/UL (ref 0.3–1)
ABSOLUTE NEUTROPHIL COUNT (OHS): 1.38 K/UL (ref 1.8–7.7)
ALBUMIN SERPL BCP-MCNC: 4 G/DL (ref 3.5–5.2)
ALP SERPL-CCNC: 82 UNIT/L (ref 40–150)
ALT SERPL W/O P-5'-P-CCNC: 23 UNIT/L (ref 10–44)
ANION GAP (OHS): 12 MMOL/L (ref 8–16)
AST SERPL-CCNC: 22 UNIT/L (ref 11–45)
BASOPHILS # BLD AUTO: 0.04 K/UL
BASOPHILS NFR BLD AUTO: 0.7 %
BILIRUB SERPL-MCNC: 0.4 MG/DL (ref 0.1–1)
BUN SERPL-MCNC: 19 MG/DL (ref 8–23)
CALCIUM SERPL-MCNC: 9.4 MG/DL (ref 8.7–10.5)
CHLORIDE SERPL-SCNC: 103 MMOL/L (ref 95–110)
CHOLEST SERPL-MCNC: 176 MG/DL (ref 120–199)
CHOLEST/HDLC SERPL: 2.3 {RATIO} (ref 2–5)
CO2 SERPL-SCNC: 26 MMOL/L (ref 23–29)
CREAT SERPL-MCNC: 1.1 MG/DL (ref 0.5–1.4)
ERYTHROCYTE [DISTWIDTH] IN BLOOD BY AUTOMATED COUNT: 12.5 % (ref 11.5–14.5)
GFR SERPLBLD CREATININE-BSD FMLA CKD-EPI: 56 ML/MIN/1.73/M2
GLUCOSE SERPL-MCNC: 92 MG/DL (ref 70–110)
HCT VFR BLD AUTO: 40 % (ref 37–48.5)
HDLC SERPL-MCNC: 78 MG/DL (ref 40–75)
HDLC SERPL: 44.3 % (ref 20–50)
HGB BLD-MCNC: 12.5 GM/DL (ref 12–16)
IMM GRANULOCYTES # BLD AUTO: 0.01 K/UL (ref 0–0.04)
IMM GRANULOCYTES NFR BLD AUTO: 0.2 % (ref 0–0.5)
LDLC SERPL CALC-MCNC: 81.2 MG/DL (ref 63–159)
LYMPHOCYTES # BLD AUTO: 4.02 K/UL (ref 1–4.8)
MCH RBC QN AUTO: 28.3 PG (ref 27–31)
MCHC RBC AUTO-ENTMCNC: 31.3 G/DL (ref 32–36)
MCV RBC AUTO: 91 FL (ref 82–98)
NONHDLC SERPL-MCNC: 98 MG/DL
NUCLEATED RBC (/100WBC) (OHS): 0 /100 WBC
PLATELET # BLD AUTO: 275 K/UL (ref 150–450)
PMV BLD AUTO: 9.5 FL (ref 9.2–12.9)
POTASSIUM SERPL-SCNC: 4.1 MMOL/L (ref 3.5–5.1)
PROT SERPL-MCNC: 7 GM/DL (ref 6–8.4)
RBC # BLD AUTO: 4.42 M/UL (ref 4–5.4)
RELATIVE EOSINOPHIL (OHS): 3 %
RELATIVE LYMPHOCYTE (OHS): 66.2 % (ref 18–48)
RELATIVE MONOCYTE (OHS): 7.2 % (ref 4–15)
RELATIVE NEUTROPHIL (OHS): 22.7 % (ref 38–73)
SODIUM SERPL-SCNC: 141 MMOL/L (ref 136–145)
TRIGL SERPL-MCNC: 84 MG/DL (ref 30–150)
TSH SERPL-ACNC: 2.46 UIU/ML (ref 0.4–4)
WBC # BLD AUTO: 6.07 K/UL (ref 3.9–12.7)

## 2025-04-15 PROCEDURE — 80053 COMPREHEN METABOLIC PANEL: CPT

## 2025-04-15 PROCEDURE — 84443 ASSAY THYROID STIM HORMONE: CPT

## 2025-04-15 PROCEDURE — 80061 LIPID PANEL: CPT

## 2025-04-15 PROCEDURE — 85025 COMPLETE CBC W/AUTO DIFF WBC: CPT

## 2025-04-15 PROCEDURE — 36415 COLL VENOUS BLD VENIPUNCTURE: CPT | Mod: PN

## 2025-04-16 ENCOUNTER — TELEPHONE (OUTPATIENT)
Dept: HEMATOLOGY/ONCOLOGY | Facility: CLINIC | Age: 65
End: 2025-04-16
Payer: MEDICARE

## 2025-04-16 ENCOUNTER — OFFICE VISIT (OUTPATIENT)
Dept: PRIMARY CARE CLINIC | Facility: CLINIC | Age: 65
End: 2025-04-16
Payer: MEDICARE

## 2025-04-16 VITALS
HEART RATE: 66 BPM | TEMPERATURE: 98 F | HEIGHT: 66 IN | SYSTOLIC BLOOD PRESSURE: 116 MMHG | WEIGHT: 123.25 LBS | BODY MASS INDEX: 19.81 KG/M2 | OXYGEN SATURATION: 99 % | DIASTOLIC BLOOD PRESSURE: 58 MMHG

## 2025-04-16 DIAGNOSIS — F32.0 CURRENT MILD EPISODE OF MAJOR DEPRESSIVE DISORDER, UNSPECIFIED WHETHER RECURRENT: ICD-10-CM

## 2025-04-16 DIAGNOSIS — I70.0 ATHEROSCLEROSIS OF AORTA: ICD-10-CM

## 2025-04-16 DIAGNOSIS — D72.820 LYMPHOCYTOSIS: ICD-10-CM

## 2025-04-16 DIAGNOSIS — N18.31 STAGE 3A CHRONIC KIDNEY DISEASE: Primary | ICD-10-CM

## 2025-04-16 PROCEDURE — 1159F MED LIST DOCD IN RCRD: CPT | Mod: CPTII,S$GLB,, | Performed by: INTERNAL MEDICINE

## 2025-04-16 PROCEDURE — 3008F BODY MASS INDEX DOCD: CPT | Mod: CPTII,S$GLB,, | Performed by: INTERNAL MEDICINE

## 2025-04-16 PROCEDURE — 1126F AMNT PAIN NOTED NONE PRSNT: CPT | Mod: CPTII,S$GLB,, | Performed by: INTERNAL MEDICINE

## 2025-04-16 PROCEDURE — 1123F ACP DISCUSS/DSCN MKR DOCD: CPT | Mod: CPTII,S$GLB,, | Performed by: INTERNAL MEDICINE

## 2025-04-16 PROCEDURE — 3288F FALL RISK ASSESSMENT DOCD: CPT | Mod: CPTII,S$GLB,, | Performed by: INTERNAL MEDICINE

## 2025-04-16 PROCEDURE — 1101F PT FALLS ASSESS-DOCD LE1/YR: CPT | Mod: CPTII,S$GLB,, | Performed by: INTERNAL MEDICINE

## 2025-04-16 PROCEDURE — 99214 OFFICE O/P EST MOD 30 MIN: CPT | Mod: S$GLB,,, | Performed by: INTERNAL MEDICINE

## 2025-04-16 PROCEDURE — 3074F SYST BP LT 130 MM HG: CPT | Mod: CPTII,S$GLB,, | Performed by: INTERNAL MEDICINE

## 2025-04-16 PROCEDURE — 3078F DIAST BP <80 MM HG: CPT | Mod: CPTII,S$GLB,, | Performed by: INTERNAL MEDICINE

## 2025-04-16 PROCEDURE — 99999 PR PBB SHADOW E&M-EST. PATIENT-LVL III: CPT | Mod: PBBFAC,,, | Performed by: INTERNAL MEDICINE

## 2025-04-16 PROCEDURE — 1160F RVW MEDS BY RX/DR IN RCRD: CPT | Mod: CPTII,S$GLB,, | Performed by: INTERNAL MEDICINE

## 2025-04-16 NOTE — PROGRESS NOTES
"INTERNAL MEDICINE PROGRESS/URGENT CARE NOTE    CHIEF COMPLAINT     Chief Complaint   Patient presents with    Follow-up     Patient is here for a 6 month follow up. She reports mild discomfort of her tailbone when she gets up from sitting. She does not recall injuring this area.        HPI     Nina Menendez is a very shekhar 63 y.o.  female who presents with a PMHx of  anxiety and depression, HLD, insomnia, who presents today for her routine follow up visit.      Her main complaints and concerns are:      At her most recent visit, we discussed:    ongoing isomnia. Has tried the melatoin and mag and has trouble staying asleep. Goes to bed around 10:30. Wakes up at 1 am and then stays up sometimes until maybe 3-5 am then finally wakes up around 7:30am. No notable apneic episodes. At her last visit, we tried tazodone, and today, she reports it did help with sleep but she does not sleep the whole night and wakes up around 3am. Thinks this has to do with being a caregiver.   daughter thinks she needs to see a therapist. She is a caregiver and I do agree she needs some therapy for caregiver stress. She was referred to and started seeing one who has since left.      Insomnia: sometimes trouble falling asleep and sometime trouble staying asleep.      Anxiety: says her mood is "good". Gets lonely when her daughter leaves on Sunday. But one of her twins live with her. used to take zoloft for depression. Says not sure when this was stopped. Now takes wellbutrin  takes zoloft 100mg daily.   Denies HI/SI        HLD: no recent labs on file.      Insomnia: Says her sleep is 'not good"   Has trouble with sleep initiation/maintenance     Smoking history. [place] smoked 20 years ago. Then during covid smoked for a short while,. But has since stopped    Lymphocytosis: referral was made to hematology 6 mo nths ago, but I do not see where an appointment was made.       Home Medications:  Prior to Admission medications    Medication " Sig Start Date End Date Taking? Authorizing Provider   ASHWAGANDHA EXTRACT ORAL Take by mouth.  Patient not taking: Reported on 12/23/2024    Provider, Historical   buPROPion (WELLBUTRIN SR) 150 MG TBSR 12 hr tablet Take 1 tablet (150 mg total) by mouth 2 (two) times daily. 12/20/24 6/18/25  Heydi Giordano MD   Lactobacillus acidophilus (PROBIOTIC ACIDOPHILUS ORAL) Take 1 capsule by mouth once daily.  Patient not taking: Reported on 12/23/2024    Provider, Historical   omega-3 fatty acids/fish oil (FISH OIL-OMEGA-3 FATTY ACIDS) 300-1,000 mg capsule Take 1 capsule by mouth once daily.  Patient not taking: Reported on 12/23/2024    Provider, Historical   rosuvastatin (CRESTOR) 10 MG tablet Take 1 tablet by mouth once daily 3/26/25   Heydi Giordano MD   traZODone (DESYREL) 50 MG tablet TAKE 1 TABLET BY MOUTH ONCE DAILY IN THE EVENING 3/6/25   Heydi Giordano MD       Review of Systems:  Review of Systems      Advance Care Planning     Date: 04/16/2025    Power of   I initiated the process of voluntary advance care planning today and explained the importance of this process to the patient.  I introduced the concept of advance directives to the patient, as well. Then the patient received detailed information about the importance of designating a Health Care Power of  (HCPOA). She was also instructed to communicate with this person about their wishes for future healthcare, should she become sick and lose decision-making capacity. The patient has previously appointed a HCPOA. After our discussion, the patient has decided to complete a HCPOA and has appointed her mother, health care agent:  on file  & health care agent number:  on file . I encouraged her to communicate with this person about their wishes for future healthcare, should she become sick and lose decision-making capacity.      A total of 10 min was spent on advance care planning, goals of care discussion, emotional support,  "formulating and communicating prognosis and exploring burden/benefit of various approaches of treatment. This discussion occurred on a fully voluntary basis with the verbal consent of the patient and/or family.           PHYSICAL EXAM     BP (!) 116/58 (BP Location: Left arm, Patient Position: Sitting)   Pulse 66   Temp 97.7 °F (36.5 °C) (Oral)   Ht 5' 6" (1.676 m)   Wt 55.9 kg (123 lb 3.8 oz)   LMP 11/14/2012   SpO2 99%   BMI 19.89 kg/m²     GEN - A+OX4, NAD   HEENT - PERRL, EOMI, OP clear  Neck - No thyromegaly or cervical LAD. No thyroid masses felt.  CV - RRR, no m/r   Chest - CTAB, no wheezing or rhonchi  Abd - S/NT/ND/+BS.   Ext - 2+BDP and radial pulses. No C/C/E.  Skin - No rash.    LABS       ASSESSMENT/PLAN     Nina Menendez is a 64 y.o. female with  1. Stage 3a chronic kidney disease  Encouraged more fluid.     2. Current mild episode of major depressive disorder, unspecified whether recurrent  Overview: Mood is good with wellbutrin. Used to be on zoloft but no more.   Denies SI/HI      3. Atherosclerosis of aorta  Overview:  Found on LDCT 5/8/2024      4. Lymphocytosis  Referral to hematology           WORRY SCORE 1    RTC in 6 months, sooner if needed and depending on labs.    Heydi Giordano MD  Board Certified Internist/Geriatrician  [facility] ([place])      "

## 2025-04-17 ENCOUNTER — TELEPHONE (OUTPATIENT)
Dept: HEMATOLOGY/ONCOLOGY | Facility: CLINIC | Age: 65
End: 2025-04-17
Payer: MEDICARE

## 2025-04-17 NOTE — TELEPHONE ENCOUNTER
Patient returned call and scheduled appointment with Dr. Gifford for 06/10 at 3PM.  Patient verbalized understanding that appt is going to be virtual and how to access virtual visit through [employer].

## 2025-06-10 ENCOUNTER — OFFICE VISIT (OUTPATIENT)
Dept: HEMATOLOGY/ONCOLOGY | Facility: CLINIC | Age: 65
End: 2025-06-10
Payer: MEDICARE

## 2025-06-10 DIAGNOSIS — D72.820 LYMPHOCYTOSIS: Primary | ICD-10-CM

## 2025-06-10 PROCEDURE — 1159F MED LIST DOCD IN RCRD: CPT | Mod: CPTII,95,, | Performed by: INTERNAL MEDICINE

## 2025-06-10 PROCEDURE — G2211 COMPLEX E/M VISIT ADD ON: HCPCS | Mod: 95,,, | Performed by: INTERNAL MEDICINE

## 2025-06-10 PROCEDURE — 98001 SYNCH AUDIO-VIDEO NEW LOW 30: CPT | Mod: 95,,, | Performed by: INTERNAL MEDICINE

## 2025-06-10 PROCEDURE — 1157F ADVNC CARE PLAN IN RCRD: CPT | Mod: CPTII,95,, | Performed by: INTERNAL MEDICINE

## 2025-06-10 PROCEDURE — 1160F RVW MEDS BY RX/DR IN RCRD: CPT | Mod: CPTII,95,, | Performed by: INTERNAL MEDICINE

## 2025-06-10 NOTE — PROGRESS NOTES
CONSULT TELEMEDICINE VISIT    Subjective:      Patient ID: Nina Menendez is a 64 y.o. female.  MRN: [id number]  : 1960    An audio and visual care visit was performed with the patient because of the COVID-19 pandemic recommendations for social distancing.    TELEMEDICINE  STATE: [place]  The patient location is: home  The chief complaint leading to consultation is: Elevated lymphocytes  Visit type: Virtual visit with synchronous audio and video    Total time spent with patient: 7 minutes  32 minutes of total time spent on the encounter, which includes face to face time and non-face to face time preparing to see the patient (eg, review of tests), obtaining and/or reviewing separately obtained history, documenting clinical information in the electronic or other health record, independently interpreting results (not separately reported) and communicating results to the patient/family/caregiver, or care coordination (not separately reported).    Each patient to whom he or she provides medical services by telemedicine is:  (1) informed of the relationship between the physician and patient and the respective role of any other health care provider with respect to management of the patient; and (2) notified that he or she may decline to receive medical services by telemedicine and may withdraw from such care at any time.    History of Present Illness:   KATE Wood is a 63 yo female presenting for evaluation for elevated lymphocytes.  She is reporting to this visit by herself.    Labs on April 15, 2025 showed a WBC 6.07 with 66.2 % lymphocytes. ALC is normal 4.02. Neutrophils 22.7% % ANC 1.38. CMP is normal.  Looking back at her blood workup going to  the patient has been having this abnormality since then.  Her total white blood cell count has been normal.  She has been anemic.  The patient denies any prolonged fever chills night sweats recurrent infection unintentional weight loss recent history  of  traveling, exposure to sick people or blood transfusions.      The patient denies CP, cough, SOB, abdominal pain, nausea, vomiting, constipation.  The patient denies fever, chills, night sweats, weight loss, new lumps or bumps, easy bruising or bleeding.    Oncology History:  Oncology History    No history exists.      Past medical, surgical, family, and social history were reviewed today and there are no changes of note unless mentioned in HPI.   MEDS and ALLERGIES were reviewed with patient and meds reconciled.     History:  Past Medical History:   Diagnosis Date    Anxiety     ASCVD 10 YEAR RISK 1.9% 04/06/2017    Depression     Hyperlipemia     in the 90s    Insomnia     S/P colonoscopy     4/12;  next due 4/17      Past Surgical History:   Procedure Laterality Date    COLONOSCOPY N/A 3/27/2024    Procedure: COLONOSCOPY;  Surgeon: Oleksandr Drake MD;  Location: [facility];  Service: Endoscopy;  Laterality: N/A;    FOOT SURGERY Bilateral 2008    fracture coorrection in right, toe surgery on the left foot    FRACTURE SURGERY      R foot     OVARIAN CYST REMOVAL      TONSILLECTOMY       Family History   Problem Relation Name Age of Onset    Cataracts Mother      Thyroid disease Mother      Hyperlipidemia Mother      Brain cancer Father      Hyperlipidemia Father      Drug abuse Brother      No Known Problems Brother      Hypertension Brother      Heart attack Maternal Grandmother      Arthritis Maternal Grandfather      Hyperlipidemia Paternal Grandmother      Diabetes Paternal Grandmother      Lung cancer Paternal Grandfather      Macular degeneration Neg Hx      Retinal detachment Neg Hx      Strabismus Neg Hx      Blindness Neg Hx      Amblyopia Neg Hx        Social History     Tobacco Use    Smoking status: Former     Current packs/day: 0.00     Average packs/day: 1.5 packs/day for 16.0 years (24.0 ttl pk-yrs)     Types: Cigarettes     Start date: 3/8/1979     Quit date: 3/8/1995     Years since quitting:  30.2    Smokeless tobacco: Never   Substance and Sexual Activity    Alcohol use: No     Comment: martini's/cocktails 1-2 xs a month    Drug use: No    Sexual activity: Yes        ROS:  Answers submitted by the patient for this visit:  Review of Systems Questionnaire (Submitted on 6/10/2025)  appetite change : No  unexpected weight change: No  mouth sores: No  visual disturbance: No  cough: No  shortness of breath: No  chest pain: No  abdominal pain: No  diarrhea: No  frequency: No  back pain: No  rash: No  headaches: No  adenopathy: No  nervous/ anxious: No      Objective:   There were no vitals filed for this visit.  Wt Readings from Last 10 Encounters:   04/16/25 55.9 kg (123 lb 3.8 oz)   12/16/24 54.4 kg (120 lb)   10/16/24 54.6 kg (120 lb 7.7 oz)   04/16/24 58.3 kg (128 lb 10.2 oz)   04/11/24 56.7 kg (125 lb)   12/11/23 56.2 kg (124 lb)   10/16/23 56.7 kg (124 lb 14.3 oz)   03/30/17 58.3 kg (128 lb 6.7 oz)   03/09/16 60 kg (132 lb 4.4 oz)   05/14/15 58.8 kg (129 lb 9.6 oz)       Physical Examination:   Constitutional: she is alert, pleasant, and does not appear to be in any physical distress   HENT: Mouth/Throat:  Tongue is midline without evidence of glossitis  Eyes: No obvious jaundice or conjunctivitis.  EOM are normal.   Pulmonary/Chest: No stridor noted. No excess chest muscle movement.  Neurological: she is alert and oriented to person, place, and time. No cranial nerve deficit.  Skin:  No rash noted. No erythema.   Psychiatric: she has a normal mood and affect. Speech and memory normal.     Diagnostic Tests:  Significant Imaging:  I have reviewed and interpreted all pertinent imaging results/findings.    Laboratory Data:  Lab Results   Component Value Date    WBC 6.07 04/15/2025    HGB 12.5 04/15/2025    HCT 40.0 04/15/2025     04/15/2025    CHOL 176 04/15/2025    TRIG 84 04/15/2025    HDL 78 (H) 04/15/2025    ALT 23 04/15/2025    AST 22 04/15/2025     04/15/2025    K 4.1 04/15/2025      04/15/2025    CREATININE 1.1 04/15/2025    BUN 19 04/15/2025    CO2 26 04/15/2025    TSH 2.457 04/15/2025    GLUF 81 10/31/2007    HGBA1C 5.6 04/03/2017        Labs:   Lab Results   Component Value Date    WBC 6.07 04/15/2025    RBC 4.42 04/15/2025    HGB 12.5 04/15/2025    HCT 40.0 04/15/2025    MCV 91 04/15/2025     04/15/2025    GLU 92 04/15/2025     04/15/2025    K 4.1 04/15/2025    BUN 19 04/15/2025    CREATININE 1.1 04/15/2025    AST 22 04/15/2025    ALT 23 04/15/2025    BILITOT 0.4 04/15/2025         Assessment/Plan:     ECOG SCORE    0 - Fully active-able to carry on all pre-disease performance without restriction       Lymphocytosis.    I reviewed independently the patient medical record including her laboratory and radiologic findings.    I discussed with her the abnormality seen on her CBC showing an increase in the the percentage of her lymphocytes but not in the absolute lymphocytic count.  This has been ongoing for more than 10 years.  The patient has been asymptomatic.  I doubt that she has an underlying lymphoproliferative disorder.  We will continue to monitor this abnormality clinically as well with repeat blood workup.  She will be seen back again in October with repeat CBC CMP ESR LDH.  Depending on the results further recommendation will be given.    Signs of possibly progression into a lymphoproliferative to school order include but not restricted to prolonged fever chills night sweats unintentional weight loss recurrent infection palpable lymphadenopathy.      Med Onc Chart Routing      Follow up with physician Kelly Navarro in October 2025 with repeat CBC CMP ESR LDH   Follow up with ILEANA    Infusion scheduling note    Injection scheduling note    Labs    Imaging    Pharmacy appointment    Other referrals                 Plan was discussed with the patient at length, and she verbalized understanding. Nina was given an opportunity to ask questions that were answered to her  satisfaction, and she was advised to call in the interval if any problems or questions arise.  Discussed COVID-19 and social distancing in great detail, avoid all non-essential visits out of the home if possible and avoid sick contacts.     Electronically signed by Alexandrea Gifford MD

## 2025-06-14 DIAGNOSIS — G47.00 INSOMNIA, UNSPECIFIED TYPE: Primary | ICD-10-CM

## 2025-06-16 RX ORDER — TRAZODONE HYDROCHLORIDE 50 MG/1
50 TABLET ORAL NIGHTLY
Qty: 90 TABLET | Refills: 1 | Status: SHIPPED | OUTPATIENT
Start: 2025-06-16

## 2025-06-26 ENCOUNTER — TELEPHONE (OUTPATIENT)
Dept: PRIMARY CARE CLINIC | Facility: CLINIC | Age: 65
End: 2025-06-26
Payer: MEDICARE

## 2025-06-26 NOTE — TELEPHONE ENCOUNTER
LCSW called patient to see if she was interested in counseling services, as she was seeing Sharlene previously. She stated yes, and scheduled an appointment with this LCSW for 7/2 @10:30am. LCSW will input into schedule once access is granted.

## 2025-06-30 ENCOUNTER — TELEPHONE (OUTPATIENT)
Dept: PRIMARY CARE CLINIC | Facility: CLINIC | Age: 65
End: 2025-06-30
Payer: MEDICARE

## 2025-06-30 DIAGNOSIS — F41.9 ANXIETY: Primary | ICD-10-CM

## 2025-06-30 DIAGNOSIS — F32.0 CURRENT MILD EPISODE OF MAJOR DEPRESSIVE DISORDER, UNSPECIFIED WHETHER RECURRENT: ICD-10-CM

## 2025-07-02 ENCOUNTER — OFFICE VISIT (OUTPATIENT)
Dept: PRIMARY CARE CLINIC | Facility: CLINIC | Age: 65
End: 2025-07-02
Payer: MEDICARE

## 2025-07-02 DIAGNOSIS — F41.9 ANXIETY: ICD-10-CM

## 2025-07-02 DIAGNOSIS — F32.0 CURRENT MILD EPISODE OF MAJOR DEPRESSIVE DISORDER, UNSPECIFIED WHETHER RECURRENT: ICD-10-CM
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
## 2025-07-21 DIAGNOSIS — F32.A DEPRESSIVE DISORDER: ICD-10-CM

## 2025-07-22 RX ORDER — BUPROPION HYDROCHLORIDE 150 MG/1
150 TABLET, EXTENDED RELEASE ORAL 2 TIMES DAILY
Qty: 180 TABLET | Refills: 3 | Status: SHIPPED | OUTPATIENT
Start: 2025-07-22

## 2025-07-23 ENCOUNTER — CLINICAL SUPPORT (OUTPATIENT)
Dept: PRIMARY CARE CLINIC | Facility: CLINIC | Age: 65
End: 2025-07-23
Payer: MEDICARE

## 2025-07-23 DIAGNOSIS — F41.9 ANXIETY: Primary | ICD-10-CM

## 2025-07-23 DIAGNOSIS — Z63.6 CAREGIVER BURDEN: ICD-10-CM

## 2025-07-23 SDOH — SOCIAL DETERMINANTS OF HEALTH (SDOH): DEPENDENT RELATIVE NEEDING CARE AT HOME: Z63.6

## 2025-07-23 NOTE — PROGRESS NOTES
"Individual Psychotherapy (LCSW)  Nina Menendez    DATE:  7/23/2025  REFERRAL SOURCE:  Heydi Giordano MD  TYPE OF VISIT:  In person  SITE:  [address] [facility]  LENGTH OF SESSION: 60  .  HISTORY OF PRESENTING ILLNESS:  Nina Menendez, a 64 y.o. female with history of Anxiety disorders; anxiety, unspecified [F41.9], caregiver burden.     CHIEF COMPLAINT/REASON FOR ENCOUNTER: Met with patient for individual psychotherapy. Pt's chief complaint includes the following: anxiety and sleep.     INTERVAL HISTORY AND CONTENT OF CURRENT SESSION:   Patient presented to clinic for follow up session with this LCSW. She states her anxiety has been better and she has not felt overwhelmed as much lately. Her older brother came in town for a visit over the past few weeks and they enjoyed going out to eat and doing other activities. After he left, she received a letter from him stating that next time he visits he will stay in a hotel as there was too much "chaos" while he was staying with their mother. Pt was not upset over this-she said she saw it coming. He seemed overwhelmed during the visit having to deal with his mother and other things that he is not used to dealing with as pt is. They talked over the phone and everything is good. She states he is on the autism spectrum, very intelligent and likes things a certain way. Her younger brother is an alcoholic who lives isolated in the mountains of [place]. She does not have much contact with him due to the conflict he causes. Her ex- will see him occasionally and give her updates on how he is doing.     Her mother had anxiety recently because one of her friend's has to go into a nursing home due to her declining health. Pt supported mother in her loss and tried to help her see things in a more positive light. She stated it started to trigger her mother into thinking she will have to go into a nursing home soon but she was able to get out of her state of " "anxiety.     She still has the same sleep issues-waking up during the night and not being able to fall back asleep easily. LCSW gave pt suggestion on what to try to help with this and she will next time to see if it works. She states her anxiety otherwise has been under control. She continues to take Wellbutrin twice daily which she states helps. She does have structured "alone time" everyday from around 8:30PM-10PM after her dtr goes to sleep. She uses this time to read, watch shows, or spend time with her boyfriend. They have been together 8 years and have a good relationship. He lives down the street from her and gets along with her dtr very well. They will also go out together when he has days off of work.       INTERVENTIONS:  Rapport building, Active listening, Educated on Sleep hygiene practices to help reestablish a consistent sleep-wake cycle, Educated patient on boundary setting as an essential part of self care and maintaining healthy relationships, and Educated on importance of prioritizing personal health and well-being through practicing self-care and engaging in hobbies or interests.     TREATMENT PLAN/GOALS:  Target symptoms: anxiety   Why chosen therapy is appropriate versus another modality: relevant to diagnosis, patient responds to this modality, evidence based practice  Outcome monitoring methods: self-report, observation  Therapeutic intervention type: insight oriented psychotherapy, supportive psychotherapy, interactive psychotherapy    RISK ASSESSMENT:  Patient reports no suicidal ideation  Patient reports no homicidal ideation  Patient reports no self-injurious behavior  Patient reports no violent behavior    MENTAL HEALTH STATUS EXAM  General Appearance:  unremarkable, age appropriate   Speech: normal tone, normal rate, normal pitch, normal volume      Level of Cooperation: cooperative      Thought Processes: normal and logical   Mood: steady      Thought Content: normal, no suicidality, no " homicidality, delusions, or paranoia   Affect: congruent and appropriate   Orientation: Oriented x3   Memory: intact for content of interview   Attention Span & Concentration: able to focus   Fund of General Knowledge: intact and appropriate to age and level of education   Abstract Reasoning: Not formally tested   Judgment & Insight: good     Language  intact     PATIENT'S RESPONSE TO INTERVENTION:  The patient's response to intervention is accepting.    PROGRESS TOWARD GOALS AND OTHER MENTAL STATUS CHANGES:  The patient's progress toward goals is excellent.    DIAGNOSIS:   Anxiety  Caregiver burden    PLAN: Pt plans to continue individual psychotherapy.  CBT and Mindfulness Techniques will be utilized in future individual therapy sessions to increase insight and support.     RETURN TO CLINIC: 3 weeks

## 2025-08-12 ENCOUNTER — TELEPHONE (OUTPATIENT)
Dept: FAMILY MEDICINE | Facility: CLINIC | Age: 65
End: 2025-08-12
Payer: MEDICARE

## 2025-08-13 ENCOUNTER — CLINICAL SUPPORT (OUTPATIENT)
Dept: PRIMARY CARE CLINIC | Facility: CLINIC | Age: 65
End: 2025-08-13
Payer: MEDICARE

## 2025-08-13 ENCOUNTER — PATIENT MESSAGE (OUTPATIENT)
Dept: FAMILY MEDICINE | Facility: CLINIC | Age: 65
End: 2025-08-13
Payer: MEDICARE

## 2025-08-13 DIAGNOSIS — Z63.6 CAREGIVER BURDEN: ICD-10-CM

## 2025-08-13 DIAGNOSIS — F41.9 ANXIETY: Primary | ICD-10-CM

## 2025-08-13 PROCEDURE — 99499 UNLISTED E&M SERVICE: CPT | Mod: S$GLB,,,

## 2025-08-13 SDOH — SOCIAL DETERMINANTS OF HEALTH (SDOH): DEPENDENT RELATIVE NEEDING CARE AT HOME: Z63.6

## 2025-09-03 ENCOUNTER — TELEPHONE (OUTPATIENT)
Dept: PRIMARY CARE CLINIC | Facility: CLINIC | Age: 65
End: 2025-09-03
Payer: MEDICARE

## 2025-09-03 ENCOUNTER — CLINICAL SUPPORT (OUTPATIENT)
Dept: PRIMARY CARE CLINIC | Facility: CLINIC | Age: 65
End: 2025-09-03
Payer: MEDICARE

## 2025-09-03 DIAGNOSIS — Z87.891 PERSONAL HISTORY OF NICOTINE DEPENDENCE: ICD-10-CM

## 2025-09-03 DIAGNOSIS — F41.9 ANXIETY: Primary | ICD-10-CM

## 2025-09-03 DIAGNOSIS — F17.200 NICOTINE DEPENDENCE, UNCOMPLICATED, UNSPECIFIED NICOTINE PRODUCT TYPE: Primary | ICD-10-CM

## 2025-09-03 DIAGNOSIS — Z63.6 CAREGIVER BURDEN: ICD-10-CM

## 2025-09-03 PROCEDURE — 99499 UNLISTED E&M SERVICE: CPT | Mod: S$GLB,,,

## 2025-09-03 SDOH — SOCIAL DETERMINANTS OF HEALTH (SDOH): DEPENDENT RELATIVE NEEDING CARE AT HOME: Z63.6

## 2025-09-04 ENCOUNTER — TELEPHONE (OUTPATIENT)
Dept: PRIMARY CARE CLINIC | Facility: CLINIC | Age: 65
End: 2025-09-04
Payer: MEDICARE

## 2025-09-04 ENCOUNTER — HOSPITAL ENCOUNTER (OUTPATIENT)
Dept: RADIOLOGY | Facility: HOSPITAL | Age: 65
Discharge: HOME | End: 2025-09-04
Attending: INTERNAL MEDICINE
Payer: MEDICARE

## 2025-09-04 DIAGNOSIS — F17.200 NICOTINE DEPENDENCE, UNCOMPLICATED, UNSPECIFIED NICOTINE PRODUCT TYPE: ICD-10-CM

## 2025-09-04 DIAGNOSIS — Z87.891 PERSONAL HISTORY OF NICOTINE DEPENDENCE: ICD-10-CM

## 2025-09-04 PROCEDURE — 71271 CT THORAX LUNG CANCER SCR C-: CPT | Mod: 26,,, | Performed by: STUDENT IN AN ORGANIZED HEALTH CARE EDUCATION/TRAINING PROGRAM

## 2025-09-04 PROCEDURE — 71271 CT THORAX LUNG CANCER SCR C-: CPT | Mod: TC,PO
